# Patient Record
Sex: FEMALE | Race: BLACK OR AFRICAN AMERICAN | NOT HISPANIC OR LATINO | Employment: UNEMPLOYED | URBAN - METROPOLITAN AREA
[De-identification: names, ages, dates, MRNs, and addresses within clinical notes are randomized per-mention and may not be internally consistent; named-entity substitution may affect disease eponyms.]

---

## 2022-04-11 ENCOUNTER — HISTORICAL (OUTPATIENT)
Dept: ADMINISTRATIVE | Facility: HOSPITAL | Age: 67
End: 2022-04-11

## 2022-04-27 VITALS
OXYGEN SATURATION: 97 % | SYSTOLIC BLOOD PRESSURE: 210 MMHG | DIASTOLIC BLOOD PRESSURE: 100 MMHG | WEIGHT: 170 LBS | HEIGHT: 62 IN | BODY MASS INDEX: 31.28 KG/M2

## 2024-01-10 ENCOUNTER — HOSPITAL ENCOUNTER (INPATIENT)
Facility: HOSPITAL | Age: 69
LOS: 2 days | Discharge: HOME | DRG: 281 | End: 2024-01-12
Attending: STUDENT IN AN ORGANIZED HEALTH CARE EDUCATION/TRAINING PROGRAM | Admitting: INTERNAL MEDICINE
Payer: MEDICARE

## 2024-01-10 DIAGNOSIS — G45.9 TIA (TRANSIENT ISCHEMIC ATTACK): Primary | ICD-10-CM

## 2024-01-10 DIAGNOSIS — I16.1 HYPERTENSIVE EMERGENCY: ICD-10-CM

## 2024-01-10 DIAGNOSIS — N39.0 URINARY TRACT INFECTION WITHOUT HEMATURIA, SITE UNSPECIFIED: ICD-10-CM

## 2024-01-10 DIAGNOSIS — I10 UNCONTROLLED HYPERTENSION: ICD-10-CM

## 2024-01-10 DIAGNOSIS — R79.89 ELEVATED TROPONIN: ICD-10-CM

## 2024-01-10 DIAGNOSIS — R07.9 CHEST PAIN: ICD-10-CM

## 2024-01-10 DIAGNOSIS — I21.4 NSTEMI (NON-ST ELEVATED MYOCARDIAL INFARCTION): ICD-10-CM

## 2024-01-10 LAB
ALBUMIN SERPL-MCNC: 4.1 G/DL (ref 3.4–4.8)
ALBUMIN/GLOB SERPL: 1.4 RATIO (ref 1.1–2)
ALP SERPL-CCNC: 78 UNIT/L (ref 40–150)
ALT SERPL-CCNC: 19 UNIT/L (ref 0–55)
APPEARANCE UR: ABNORMAL
APTT PPP: 51.8 SECONDS (ref 23.2–33.7)
APTT PPP: 76 SECONDS (ref 23.2–33.7)
AST SERPL-CCNC: 22 UNIT/L (ref 5–34)
BACTERIA #/AREA URNS AUTO: ABNORMAL /HPF
BASOPHILS # BLD AUTO: 0.03 X10(3)/MCL
BASOPHILS NFR BLD AUTO: 0.4 %
BILIRUB SERPL-MCNC: 0.8 MG/DL
BILIRUB UR QL STRIP.AUTO: NEGATIVE
BUN SERPL-MCNC: 16.9 MG/DL (ref 9.8–20.1)
CALCIUM SERPL-MCNC: 9.2 MG/DL (ref 8.4–10.2)
CHLORIDE SERPL-SCNC: 111 MMOL/L (ref 98–107)
CHOLEST SERPL-MCNC: 265 MG/DL
CHOLEST/HDLC SERPL: 4 {RATIO} (ref 0–5)
CO2 SERPL-SCNC: 22 MMOL/L (ref 23–31)
COLOR UR AUTO: ABNORMAL
CREAT SERPL-MCNC: 1.11 MG/DL (ref 0.55–1.02)
EOSINOPHIL # BLD AUTO: 0.01 X10(3)/MCL (ref 0–0.9)
EOSINOPHIL NFR BLD AUTO: 0.1 %
ERYTHROCYTE [DISTWIDTH] IN BLOOD BY AUTOMATED COUNT: 13.9 % (ref 11.5–17)
GFR SERPLBLD CREATININE-BSD FMLA CKD-EPI: 54 MLS/MIN/1.73/M2
GLOBULIN SER-MCNC: 3 GM/DL (ref 2.4–3.5)
GLUCOSE SERPL-MCNC: 110 MG/DL (ref 82–115)
GLUCOSE UR QL STRIP.AUTO: ABNORMAL
HCT VFR BLD AUTO: 46.5 % (ref 37–47)
HDLC SERPL-MCNC: 70 MG/DL (ref 35–60)
HGB BLD-MCNC: 15.5 G/DL (ref 12–16)
HOLD SPECIMEN: NORMAL
HYALINE CASTS #/AREA URNS LPF: ABNORMAL /LPF
IMM GRANULOCYTES # BLD AUTO: 0.02 X10(3)/MCL (ref 0–0.04)
IMM GRANULOCYTES NFR BLD AUTO: 0.3 %
INR PPP: 0.9
INR PPP: 1
KETONES UR QL STRIP.AUTO: NEGATIVE
LACTATE SERPL-SCNC: 1.4 MMOL/L (ref 0.5–2.2)
LDLC SERPL CALC-MCNC: 175 MG/DL (ref 50–140)
LEUKOCYTE ESTERASE UR QL STRIP.AUTO: NEGATIVE
LYMPHOCYTES # BLD AUTO: 0.98 X10(3)/MCL (ref 0.6–4.6)
LYMPHOCYTES NFR BLD AUTO: 12.4 %
MAGNESIUM SERPL-MCNC: 2 MG/DL (ref 1.6–2.6)
MCH RBC QN AUTO: 32.2 PG (ref 27–31)
MCHC RBC AUTO-ENTMCNC: 33.3 G/DL (ref 33–36)
MCV RBC AUTO: 96.7 FL (ref 80–94)
MONOCYTES # BLD AUTO: 0.61 X10(3)/MCL (ref 0.1–1.3)
MONOCYTES NFR BLD AUTO: 7.7 %
MUCOUS THREADS URNS QL MICRO: ABNORMAL /LPF
NEUTROPHILS # BLD AUTO: 6.26 X10(3)/MCL (ref 2.1–9.2)
NEUTROPHILS NFR BLD AUTO: 79.1 %
NITRITE UR QL STRIP.AUTO: NEGATIVE
NRBC BLD AUTO-RTO: 0 %
PH UR STRIP.AUTO: 6 [PH]
PLATELET # BLD AUTO: 178 X10(3)/MCL (ref 130–400)
PMV BLD AUTO: 12.5 FL (ref 7.4–10.4)
POCT GLUCOSE: 93 MG/DL (ref 70–110)
POTASSIUM SERPL-SCNC: 3.7 MMOL/L (ref 3.5–5.1)
PROT SERPL-MCNC: 7.1 GM/DL (ref 5.8–7.6)
PROT UR QL STRIP.AUTO: ABNORMAL
PROTHROMBIN TIME: 12.5 SECONDS (ref 11.4–14)
PROTHROMBIN TIME: 13.4 SECONDS (ref 11.4–14)
RBC # BLD AUTO: 4.81 X10(6)/MCL (ref 4.2–5.4)
RBC #/AREA URNS AUTO: ABNORMAL /HPF
RBC UR QL AUTO: ABNORMAL
SODIUM SERPL-SCNC: 142 MMOL/L (ref 136–145)
SP GR UR STRIP.AUTO: 1.02 (ref 1–1.03)
SQUAMOUS #/AREA URNS LPF: ABNORMAL /HPF
TRIGL SERPL-MCNC: 98 MG/DL (ref 37–140)
TROPONIN I SERPL-MCNC: 0.08 NG/ML (ref 0–0.04)
TROPONIN I SERPL-MCNC: 0.1 NG/ML (ref 0–0.04)
TROPONIN I SERPL-MCNC: 0.13 NG/ML (ref 0–0.04)
UROBILINOGEN UR STRIP-ACNC: NORMAL
VLDLC SERPL CALC-MCNC: 20 MG/DL
WBC # SPEC AUTO: 7.91 X10(3)/MCL (ref 4.5–11.5)
WBC #/AREA URNS AUTO: ABNORMAL /HPF

## 2024-01-10 PROCEDURE — 96375 TX/PRO/DX INJ NEW DRUG ADDON: CPT

## 2024-01-10 PROCEDURE — 85730 THROMBOPLASTIN TIME PARTIAL: CPT

## 2024-01-10 PROCEDURE — 87040 BLOOD CULTURE FOR BACTERIA: CPT | Performed by: STUDENT IN AN ORGANIZED HEALTH CARE EDUCATION/TRAINING PROGRAM

## 2024-01-10 PROCEDURE — 83735 ASSAY OF MAGNESIUM: CPT | Performed by: STUDENT IN AN ORGANIZED HEALTH CARE EDUCATION/TRAINING PROGRAM

## 2024-01-10 PROCEDURE — 82962 GLUCOSE BLOOD TEST: CPT

## 2024-01-10 PROCEDURE — 93005 ELECTROCARDIOGRAM TRACING: CPT

## 2024-01-10 PROCEDURE — 11000001 HC ACUTE MED/SURG PRIVATE ROOM

## 2024-01-10 PROCEDURE — 85025 COMPLETE CBC W/AUTO DIFF WBC: CPT | Performed by: STUDENT IN AN ORGANIZED HEALTH CARE EDUCATION/TRAINING PROGRAM

## 2024-01-10 PROCEDURE — 84484 ASSAY OF TROPONIN QUANT: CPT

## 2024-01-10 PROCEDURE — 85730 THROMBOPLASTIN TIME PARTIAL: CPT | Performed by: INTERNAL MEDICINE

## 2024-01-10 PROCEDURE — 25500020 PHARM REV CODE 255

## 2024-01-10 PROCEDURE — 25000003 PHARM REV CODE 250

## 2024-01-10 PROCEDURE — 81001 URINALYSIS AUTO W/SCOPE: CPT | Performed by: STUDENT IN AN ORGANIZED HEALTH CARE EDUCATION/TRAINING PROGRAM

## 2024-01-10 PROCEDURE — 85610 PROTHROMBIN TIME: CPT | Performed by: STUDENT IN AN ORGANIZED HEALTH CARE EDUCATION/TRAINING PROGRAM

## 2024-01-10 PROCEDURE — 87086 URINE CULTURE/COLONY COUNT: CPT | Performed by: STUDENT IN AN ORGANIZED HEALTH CARE EDUCATION/TRAINING PROGRAM

## 2024-01-10 PROCEDURE — 80053 COMPREHEN METABOLIC PANEL: CPT | Performed by: STUDENT IN AN ORGANIZED HEALTH CARE EDUCATION/TRAINING PROGRAM

## 2024-01-10 PROCEDURE — 63600175 PHARM REV CODE 636 W HCPCS: Performed by: STUDENT IN AN ORGANIZED HEALTH CARE EDUCATION/TRAINING PROGRAM

## 2024-01-10 PROCEDURE — 99285 EMERGENCY DEPT VISIT HI MDM: CPT | Mod: 25

## 2024-01-10 PROCEDURE — 85610 PROTHROMBIN TIME: CPT

## 2024-01-10 PROCEDURE — 83605 ASSAY OF LACTIC ACID: CPT | Performed by: STUDENT IN AN ORGANIZED HEALTH CARE EDUCATION/TRAINING PROGRAM

## 2024-01-10 PROCEDURE — 63600175 PHARM REV CODE 636 W HCPCS

## 2024-01-10 PROCEDURE — 84484 ASSAY OF TROPONIN QUANT: CPT | Performed by: STUDENT IN AN ORGANIZED HEALTH CARE EDUCATION/TRAINING PROGRAM

## 2024-01-10 PROCEDURE — 80061 LIPID PANEL: CPT

## 2024-01-10 PROCEDURE — A9577 INJ MULTIHANCE: HCPCS

## 2024-01-10 PROCEDURE — 96374 THER/PROPH/DIAG INJ IV PUSH: CPT

## 2024-01-10 RX ORDER — CLONIDINE HYDROCHLORIDE 0.1 MG/1
0.1 TABLET ORAL ONCE
Status: COMPLETED | OUTPATIENT
Start: 2024-01-10 | End: 2024-01-10

## 2024-01-10 RX ORDER — LABETALOL HCL 20 MG/4 ML
5 SYRINGE (ML) INTRAVENOUS
Status: COMPLETED | OUTPATIENT
Start: 2024-01-10 | End: 2024-01-10

## 2024-01-10 RX ORDER — NIFEDIPINE 30 MG/1
30 TABLET, EXTENDED RELEASE ORAL DAILY
Status: DISCONTINUED | OUTPATIENT
Start: 2024-01-10 | End: 2024-01-10

## 2024-01-10 RX ORDER — GLUCAGON 1 MG
1 KIT INJECTION
Status: DISCONTINUED | OUTPATIENT
Start: 2024-01-10 | End: 2024-01-12 | Stop reason: HOSPADM

## 2024-01-10 RX ORDER — METHYLPREDNISOLONE SOD SUCC 125 MG
125 VIAL (EA) INJECTION
Status: COMPLETED | OUTPATIENT
Start: 2024-01-10 | End: 2024-01-10

## 2024-01-10 RX ORDER — AMLODIPINE BESYLATE 5 MG/1
5 TABLET ORAL DAILY
Status: DISCONTINUED | OUTPATIENT
Start: 2024-01-10 | End: 2024-01-10

## 2024-01-10 RX ORDER — CLONIDINE HYDROCHLORIDE 0.1 MG/1
0.1 TABLET ORAL ONCE
Status: DISCONTINUED | OUTPATIENT
Start: 2024-01-10 | End: 2024-01-10

## 2024-01-10 RX ORDER — SODIUM CHLORIDE 0.9 % (FLUSH) 0.9 %
10 SYRINGE (ML) INJECTION EVERY 12 HOURS PRN
Status: DISCONTINUED | OUTPATIENT
Start: 2024-01-10 | End: 2024-01-12 | Stop reason: HOSPADM

## 2024-01-10 RX ORDER — HYDRALAZINE HYDROCHLORIDE 20 MG/ML
10 INJECTION INTRAMUSCULAR; INTRAVENOUS EVERY 4 HOURS PRN
Status: DISCONTINUED | OUTPATIENT
Start: 2024-01-10 | End: 2024-01-12 | Stop reason: HOSPADM

## 2024-01-10 RX ORDER — HEPARIN SODIUM,PORCINE/D5W 25000/250
0-40 INTRAVENOUS SOLUTION INTRAVENOUS CONTINUOUS
Status: DISCONTINUED | OUTPATIENT
Start: 2024-01-10 | End: 2024-01-12

## 2024-01-10 RX ORDER — DIPHENHYDRAMINE HYDROCHLORIDE 50 MG/ML
50 INJECTION INTRAMUSCULAR; INTRAVENOUS
Status: COMPLETED | OUTPATIENT
Start: 2024-01-10 | End: 2024-01-10

## 2024-01-10 RX ORDER — IBUPROFEN 200 MG
24 TABLET ORAL
Status: DISCONTINUED | OUTPATIENT
Start: 2024-01-10 | End: 2024-01-12 | Stop reason: HOSPADM

## 2024-01-10 RX ORDER — LABETALOL HCL 20 MG/4 ML
10 SYRINGE (ML) INTRAVENOUS EVERY 4 HOURS PRN
Status: DISCONTINUED | OUTPATIENT
Start: 2024-01-10 | End: 2024-01-12 | Stop reason: HOSPADM

## 2024-01-10 RX ORDER — NIFEDIPINE 30 MG/1
30 TABLET, EXTENDED RELEASE ORAL NIGHTLY
Status: DISCONTINUED | OUTPATIENT
Start: 2024-01-10 | End: 2024-01-11

## 2024-01-10 RX ORDER — NALOXONE HCL 0.4 MG/ML
0.02 VIAL (ML) INJECTION
Status: DISCONTINUED | OUTPATIENT
Start: 2024-01-10 | End: 2024-01-12 | Stop reason: HOSPADM

## 2024-01-10 RX ORDER — IBUPROFEN 200 MG
16 TABLET ORAL
Status: DISCONTINUED | OUTPATIENT
Start: 2024-01-10 | End: 2024-01-12 | Stop reason: HOSPADM

## 2024-01-10 RX ORDER — ACETAMINOPHEN 325 MG/1
650 TABLET ORAL ONCE
Status: COMPLETED | OUTPATIENT
Start: 2024-01-10 | End: 2024-01-10

## 2024-01-10 RX ORDER — CIPROFLOXACIN 2 MG/ML
400 INJECTION, SOLUTION INTRAVENOUS ONCE
Status: DISCONTINUED | OUTPATIENT
Start: 2024-01-10 | End: 2024-01-10

## 2024-01-10 RX ADMIN — METHYLPREDNISOLONE SODIUM SUCCINATE 125 MG: 125 INJECTION, POWDER, FOR SOLUTION INTRAMUSCULAR; INTRAVENOUS at 11:01

## 2024-01-10 RX ADMIN — GADOBENATE DIMEGLUMINE 17 ML: 529 INJECTION, SOLUTION INTRAVENOUS at 03:01

## 2024-01-10 RX ADMIN — LABETALOL HYDROCHLORIDE 5 MG: 5 INJECTION, SOLUTION INTRAVENOUS at 11:01

## 2024-01-10 RX ADMIN — DIPHENHYDRAMINE HYDROCHLORIDE 50 MG: 50 INJECTION INTRAMUSCULAR; INTRAVENOUS at 11:01

## 2024-01-10 RX ADMIN — CLONIDINE HYDROCHLORIDE 0.1 MG: 0.1 TABLET ORAL at 04:01

## 2024-01-10 RX ADMIN — ACETAMINOPHEN 650 MG: 325 TABLET, FILM COATED ORAL at 10:01

## 2024-01-10 RX ADMIN — IOHEXOL 100 ML: 350 INJECTION, SOLUTION INTRAVENOUS at 12:01

## 2024-01-10 RX ADMIN — NIFEDIPINE 30 MG: 30 TABLET, FILM COATED, EXTENDED RELEASE ORAL at 09:01

## 2024-01-10 RX ADMIN — HEPARIN SODIUM 12 UNITS/KG/HR: 10000 INJECTION, SOLUTION INTRAVENOUS at 04:01

## 2024-01-10 NOTE — H&P
[facility] Medicine  History & Physical      Patient Name: Tiffanie Rowan  : 1955  MRN: [id number]  Patient Class: IP- Inpatient   Admission Date: 1/10/2024   Length of Stay: 0  Admitting Service: Hospital Medicine  Attending Physician: Priscilla Bearden MD  PCP: No, Primary Doctor  Source of history: Patient, patient's family, and EMR.   Code status: Full Code    Chief Complaint   Fall (Pt reports waking this am an unable to move right side, pt states she fell, hit head, no LOC. Hematoma to right side of head. Pt states slurred speech. Paramedics showed up, pt returned to normal movement, no slurred speech. PT IS ASYMPTOMATIC at this time. Dr. Carrasco in triage assessing patient. CBG 93. No stroke alert called. )    History of Present Illness   68 y.o. female with HTN and gout presents to ED for right sided upper and lower extremity onset this morning, lasting several minutes. Reports resolution of symptoms at this time. Associated symptoms include headache and blurred vision. Denies chest pain, falls, dysuria, recent illness, fever, n/v. Reports has hx of htn but stopped taking BP meds approx 3 years ago.     ROS   Pertinent positive and negative as mentioned in HPI   Review of Systems   Constitutional:  Negative for chills and fever.   HENT:  Negative for congestion and sore throat.    Eyes:  Positive for blurred vision. Negative for discharge.   Respiratory:  Negative for cough and shortness of breath.    Cardiovascular:  Negative for chest pain.   Gastrointestinal:  Negative for constipation, diarrhea and vomiting.   Genitourinary:  Negative for dysuria and hematuria.   Skin:  Negative for rash.   Neurological:  Positive for weakness and headaches. Negative for dizziness and seizures.   Endo/Heme/Allergies:  Does not bruise/bleed easily.   Psychiatric/Behavioral:  Negative for hallucinations.      Past Medical History   No past medical history on file.  Past Surgical History   No  past surgical history on file.  Social History     Social History     Tobacco Use    Smoking status: Not on file    Smokeless tobacco: Not on file   Substance Use Topics    Alcohol use: Not on file      Family History   Reviewed and noncontributory    Allergies   Ciprofloxacin and Keflex [cephalexin]  Home Medications     Prior to Admission medications    Not on File      Inpatient Medications   Scheduled Meds    Continuous Infusions   heparin (porcine) in D5W 12 Units/kg/hr (01/10/24 1617)     PRN Meds  dextrose 10%, dextrose 10%, glucagon (human recombinant), glucose, glucose, heparin (PORCINE), heparin (PORCINE), hydrALAZINE **AND** labetalol, naloxone, sodium chloride 0.9%    Physical Exam   Vital Signs  Temp:  [98.2 °F (36.8 °C)]   Pulse:  [71-89]   Resp:  [15-23]   BP: (173-237)/()   SpO2:  [96 %-100 %]       General: Appears comfortable  HEENT: NC/AT  Neck:  No JVD  CVS: Regular rhythm. Normal S1/S2.  Abdomen: nondistended, normoactive BS, soft and non-tender.  MSK: No obvious deformity or joint swelling  Skin: Warm and dry  Neuro: AAOx3, no focal neurological deficit. CN II-XII grossly intact. Normal speech without dysarthria.   Psych: Cooperative    Labs   I have reviewed the following results below:  CBC  Recent Labs     01/10/24  0939   WBC 7.91   RBC 4.81   HGB 15.5   HCT 46.5   MCV 96.7*   MCH 32.2*   MCHC 33.3   RDW 13.9        Coags  Recent Labs     01/10/24  1625   INR 1.0     Cardiac  Recent Labs     01/10/24  0939 01/10/24  1408 01/10/24  1625   TROPONINI 0.078* 0.126* 0.104*     ABG/Lactate  Recent Labs     01/10/24  1124   LACTIC 1.4      Urinalysis  Recent Labs     01/10/24  1002   COLORUA Light-Yellow   APPEARANCEUA Turbid*   SGUA 1.018   PHUA 6.0   PROTEINUA 1+*   GLUCOSEUA Trace*   KETONESUA Negative   BLOODUA 1+*   UROBILINOGEN Normal   NITRITESUA Negative   LEUKOCYTESUR Negative      BMP  Recent Labs     01/10/24  0939      K 3.7   CHLORIDE 111*   CO2 22*   BUN 16.9    CREATININE 1.11*   GLUCOSE 110   CALCIUM 9.2   MG 2.00     LFTs  Recent Labs     01/10/24  0939   ALBUMIN 4.1   GLOBULIN 3.0   ALKPHOS 78   ALT 19   AST 22   BILITOT 0.8     Lipid  Recent Labs     01/10/24  0939   CHOL 265*   TRIG 98   .00*   VLDL 20   HDL 70*     Diabetes  Recent Labs     01/10/24  0939   GLUCOSE 110        Microbiology Results (last 7 days)       Procedure Component Value Units Date/Time    Blood culture #1 **CANNOT BE ORDERED STAT** [[id number]] Collected: 01/10/24 1124    Order Status: Resulted Specimen: Blood from Antecubital, Left Updated: 01/10/24 1128    Blood culture #2 **CANNOT BE ORDERED STAT** [[id number]] Collected: 01/10/24 1124    Order Status: Resulted Specimen: Blood from Hand, Left Updated: 01/10/24 1128    Urine culture [[id number]] Collected: 01/10/24 1002    Order Status: Sent Specimen: Urine Updated: 01/10/24 1013           Imaging     MRI Brain W WO Contrast   Final Result      1. No acute intracranial abnormality.   2. Moderate chronic microvascular ischemic changes.         Electronically signed by: Micki Ambriz   Date:    01/10/2024   Time:    15:52      CTA Head and Neck (xpd)   Final Result      No large vessel occlusion or flow-limiting stenosis.         Electronically signed by: Micki Ambriz   Date:    01/10/2024   Time:    12:34      CT Head Without Contrast   Final Result      No acute intracranial process identified.         Electronically signed by: Kyler Boo   Date:    01/10/2024   Time:    10:52      X-Ray Chest AP Portable   Final Result      No acute chest disease is identified.         Electronically signed by: Dawson Frazier   Date:    01/10/2024   Time:    10:30        Assessment & Plan     HTN Emergency   TIA  ACS - NSTEMI  /120 upon arrival to ED, with BP max 237/143  She received labetalol which improved BP to 180s/100s, then dose of clonidine   Initial Troponin 0.072, then 0.126, improving to 0.104. continue to trend  EKG  "without ROSSI, inverted T waves in V5, V6, no prior EKGs  Denies ever having chest pain and currently without chest pain   Likely type II NSTEMI, will start heparin drip  CTA head/neck without evidence of hemorrhage, vascular occulusion, or stenosis   MRI brain significant for chronic microvascular changes  CXR without acute cardiopulmonary findings    Obtain echo  Consider starting nifedipine in AM as patient requests not to give amlodipine or lisinopril as these meds "make my chest feel weird".  Continue with PRN labetalol, hydralazine   Low threshold to start cleviprex drip and upgrade to ICU due to elevated troponins, however will keep on floor for now given appropriate response to labetalol, asymptomatic, and downtrending troponin.    Access: pIV  Diet: heart healthy  DVT Prophylaxis: heparin drip    Ethel Turner MD  Family Medicine, [facility]     "

## 2024-01-10 NOTE — ED PROVIDER NOTES
Encounter Date: 1/10/2024       History     Chief Complaint   Patient presents with    Fall     Pt reports waking this am an unable to move right side, pt states she fell, hit head, no LOC. Hematoma to right side of head. Pt states slurred speech. Paramedics showed up, pt returned to normal movement, no slurred speech. PT IS ASYMPTOMATIC at this time. Dr. Carrasco in triage assessing patient. CBG 93. No stroke alert called.      Patient presents to the emergency department for weakness that is now resolved.  She states she went to bed last night around 1:00 a.m. he was feeling fine.  She states she woke up this morning, noticed she could move her right arm or right leg and they felt numb.  She tried to get up out of bed and fell onto her right side hitting her head.  She denies any loss of consciousness.  She was states shortly after paramedics arrived her symptoms had completely resolved and she was no complaints now.  She also reports her speech was slurred during this episode.  No current changes in vision, no current weakness, no current difficulty speaking.  Does have a history of hypertension in his a smoker.  No previous history of a CVA.    The history is provided by the patient.     Review of patient's allergies indicates:   Allergen Reactions    Ciprofloxacin Itching    Iodine Itching, Swelling and Hives    Cephalexin Hives     No past medical history on file.  No past surgical history on file.  No family history on file.  Social History     Tobacco Use    Smoking status: Former     Current packs/day: 0.00     Types: Cigarettes     Quit date: 1/10/2024     Years since quittin.0     Passive exposure: Past    Smokeless tobacco: Never     Review of Systems   Constitutional:  Negative for chills and fever.   HENT:  Negative for congestion and sore throat.    Respiratory:  Negative for cough and shortness of breath.    Cardiovascular:  Negative for chest pain and palpitations.   Gastrointestinal:  Negative for  abdominal pain and nausea.   Genitourinary:  Negative for dysuria and hematuria.   Musculoskeletal:  Negative for arthralgias and myalgias.   Neurological:  Positive for speech difficulty and weakness. Negative for dizziness.       Physical Exam     Initial Vitals [01/10/24 0857]   BP Pulse Resp Temp SpO2   (!) 190/120 89 18 98.2 °F (36.8 °C) 100 %      MAP       --         Physical Exam    Nursing note and vitals reviewed.  Constitutional: She appears well-developed and well-nourished.   HENT:   Head: Normocephalic and atraumatic.   Eyes: EOM are normal. Pupils are equal, round, and reactive to light.   Neck: Neck supple.   Normal range of motion.  Cardiovascular:  Normal rate, regular rhythm and normal heart sounds.           Pulmonary/Chest: Breath sounds normal. No respiratory distress.   Abdominal: Abdomen is soft. There is no abdominal tenderness.   Musculoskeletal:         General: No edema. Normal range of motion.      Cervical back: Normal range of motion and neck supple.     Neurological: She is alert and oriented to person, place, and time. She has normal strength. No cranial nerve deficit or sensory deficit.   Skin: Skin is warm and dry.         ED Course   Procedures  Labs Reviewed   COMPREHENSIVE METABOLIC PANEL - Abnormal; Notable for the following components:       Result Value    Chloride 111 (*)     Carbon Dioxide 22 (*)     Creatinine 1.11 (*)     All other components within normal limits   TROPONIN I - Abnormal; Notable for the following components:    Troponin-I 0.078 (*)     All other components within normal limits   URINALYSIS, REFLEX TO URINE CULTURE - Abnormal; Notable for the following components:    Appearance, UA Turbid (*)     Protein, UA 1+ (*)     Glucose, UA Trace (*)     Blood, UA 1+ (*)     WBC, UA 11-20 (*)     Bacteria, UA Moderate (*)     Squamous Epithelial Cells, UA Few (*)     Mucous, UA Trace (*)     Hyaline Casts, UA 0-2 (*)     All other components within normal limits    CBC WITH DIFFERENTIAL - Abnormal; Notable for the following components:    MCV 96.7 (*)     MCH 32.2 (*)     MPV 12.5 (*)     All other components within normal limits   TROPONIN I - Abnormal; Notable for the following components:    Troponin-I 0.126 (*)     All other components within normal limits   LIPID PANEL - Abnormal; Notable for the following components:    Cholesterol Total 265 (*)     HDL Cholesterol 70 (*)     LDL Cholesterol 175.00 (*)     All other components within normal limits   APTT - Abnormal; Notable for the following components:    PTT 51.8 (*)     All other components within normal limits   TROPONIN I - Abnormal; Notable for the following components:    Troponin-I 0.104 (*)     All other components within normal limits   APTT - Abnormal; Notable for the following components:    PTT 76.0 (*)     All other components within normal limits   APTT - Abnormal; Notable for the following components:    PTT 66.1 (*)     All other components within normal limits   COMPREHENSIVE METABOLIC PANEL - Abnormal; Notable for the following components:    Chloride 108 (*)     Carbon Dioxide 20 (*)     Glucose Level 128 (*)     Creatinine 1.20 (*)     All other components within normal limits   CBC WITH DIFFERENTIAL - Abnormal; Notable for the following components:    MCV 96.8 (*)     MCH 32.5 (*)     MPV 13.5 (*)     IPF 11.5 (*)     All other components within normal limits   MAGNESIUM - Normal   PROTIME-INR - Normal   LACTIC ACID, PLASMA - Normal   PROTIME-INR - Normal   PHOSPHORUS - Normal   MAGNESIUM - Normal   TSH - Normal   T4, FREE - Normal   CBC W/ AUTO DIFFERENTIAL    Narrative:     The following orders were created for panel order CBC auto differential.  Procedure                               Abnormality         Status                     ---------                               -----------         ------                     CBC with Differential[[id number]]       Abnormal            Final result                  Please view results for these tests on the individual orders.   EXTRA TUBES    Narrative:     The following orders were created for panel order EXTRA TUBES.  Procedure                               Abnormality         Status                     ---------                               -----------         ------                     Gold Top Hold[[id number]]                                   Final result                 Please view results for these tests on the individual orders.   GOLD TOP HOLD   EXTRA TUBES    Narrative:     The following orders were created for panel order EXTRA TUBES.  Procedure                               Abnormality         Status                     ---------                               -----------         ------                     Light Green Top Hold[[id number]]                            Final result               Lavender Top Hold[[id number]]                               Final result                 Please view results for these tests on the individual orders.   LIGHT GREEN TOP HOLD   LAVENDER TOP HOLD   CBC W/ AUTO DIFFERENTIAL    Narrative:     The following orders were created for panel order CBC Auto Differential.  Procedure                               Abnormality         Status                     ---------                               -----------         ------                     CBC with Differential[[id number]]       Abnormal            Final result                 Please view results for these tests on the individual orders.   HEMOGLOBIN A1C   POCT GLUCOSE     EKG Readings: (Independently Interpreted)   Initial Reading: No STEMI. Rhythm: Normal Sinus Rhythm. Heart Rate: 83. Ectopy: No Ectopy. Conduction: Normal. Axis: Normal.     ECG Results              EKG 12-lead (Final result)  Result time 01/10/24 20:01:22      Final result by Interface, Lab In [facility] (01/10/24 20:01:22)                   Narrative:    Test Reason : I16.1,    Vent. Rate : 081 BPM     Atrial  Rate : 081 BPM     P-R Int : 154 ms          QRS Dur : 080 ms      QT Int : 414 ms       P-R-T Axes : 074 019 -02 degrees     QTc Int : 480 ms    Normal sinus rhythm  T wave abnormality, consider lateral ischemia  Prolonged QT  Abnormal ECG  When compared with ECG of 10-DERECK-2024 09:16,  Nonspecific T wave abnormality now evident in Inferior leads  Confirmed by Liu Blair MD ([id number]) on 1/10/2024 8:01:15 PM    Referred By: RODRI   SELF           Confirmed By:Liu Blair MD                                     EKG 12-lead (Final result)  Result time 01/10/24 19:56:13      Final result by Interface, Lab In [facility] (01/10/24 19:56:13)                   Narrative:    Test Reason : G45.9,    Vent. Rate : 083 BPM     Atrial Rate : 083 BPM     P-R Int : 122 ms          QRS Dur : 084 ms      QT Int : 388 ms       P-R-T Axes : 071 009 109 degrees     QTc Int : 455 ms    Normal sinus rhythm  Nonspecific T wave abnormality  Abnormal ECG  No previous ECGs available  Confirmed by Liu Blair MD ([id number]) on 1/10/2024 7:56:02 PM    Referred By: AAAREFROGE   SELF           Confirmed By:Liu Blair MD                                     EKG 12-LEAD (Final result)  Result time 01/12/24 10:01:17      Final result by Unknown User (01/12/24 10:01:17)                                      Imaging Results              MRI Brain W WO Contrast (Final result)  Result time 01/10/24 15:52:10      Final result by Micki Ambriz MD (01/10/24 15:52:10)                   Impression:      1. No acute intracranial abnormality.  2. Moderate chronic microvascular ischemic changes.      Electronically signed by: Micki Ambriz  Date:    01/10/2024  Time:    15:52               Narrative:    EXAMINATION:  MRI BRAIN W WO CONTRAST    CLINICAL HISTORY:  Transient ischemic attack (TIA);    TECHNIQUE:  Multiplanar, multisequence MR images of the brain were obtained with and without administration of intravenous contrast.    COMPARISON:  CT  head dated 01/10/2024    FINDINGS:  There is no restricted diffusion, hemorrhage or edema.  Moderate patchy T2/FLAIR hyperintensities in the subcortical and periventricular white matter likely represent chronic microvascular ischemic changes.  There is no abnormal parenchymal or leptomeningeal enhancement.    There is no mass effect or midline shift.  The basal cisterns are patent.  There is mild diffuse parenchymal volume loss.  No hydrocephalus or abnormal extra-axial fluid collection.  The major intracranial flow voids are patent.  There is trace scattered paranasal sinus mucosal thickening.                                       CTA Head and Neck (xpd) (Final result)  Result time 01/10/24 12:34:59      Final result by Micki Ambrzi MD (01/10/24 12:34:59)                   Impression:      No large vessel occlusion or flow-limiting stenosis.      Electronically signed by: Micki Ambriz  Date:    01/10/2024  Time:    12:34               Narrative:    EXAMINATION:  CTA HEAD AND NECK (XPD)    CLINICAL HISTORY:  Stroke/TIA, determine embolic source;    TECHNIQUE:  Axial images obtained through the cervical region and [place] before and after the administration of intravenous contrast.    Coronal, sagittal, MIP and 3D reconstructions were obtained from the axial data.    Automatic exposure control was utilized to limit radiation dose.    Radiation Dose:    Total DLP: 1863 mGy*cm    COMPARISON:  CT head dated 01/10/2023    FINDINGS:  Head CT with contrast:    No interval changes when compared to the previous CT.    No enhancing abnormalities.    If present, stenosis of the carotid bulbs is measured based on NASCET criteria,    i.e. area of maximal stenosis compared to the cervical ICA distal to the bulb.    Cervical CTA:    The origins of the great vessels are patent with mild scattered calcifications.    The common carotid arteries, carotid bulbs and internal carotid arteries are patent.  There are  mild calcifications at the carotid bulbs without hemodynamically significant stenosis.    The vertebral arteries are patent.    Intracranial CTA:    There are calcifications along the course of the carotid siphons with mild narrowing bilaterally.  The middle cerebral arteries and anterior cerebral arteries are patent, with mild narrowing of a distal right MCA branch..    The vertebral arteries, basilar artery and posterior cerebral arteries are patent, with mild narrowing of the left posterior cerebral artery.    The dural venous sinuses are patent.                                       CT Head Without Contrast (Final result)  Result time 01/10/24 10:52:06      Final result by Kyler Boo MD (01/10/24 10:52:06)                   Impression:      No acute intracranial process identified.      Electronically signed by: Kyler Boo  Date:    01/10/2024  Time:    10:52               Narrative:    EXAMINATION:  CT HEAD WITHOUT CONTRAST    CLINICAL HISTORY:  Neuro deficit, acute, stroke suspected;    TECHNIQUE:  CT images of the head without IV contrast. Axial, coronal and sagittal images reviewed. Dose length product 2052 mGycm. Automatic exposure control, adjustment of mA/kV or iterative reconstruction technique used to limit radiation dose.    COMPARISON:  No relevant comparison studies available at the time of dictation.    FINDINGS:  Extra-axial spaces/ventricular system: Normal for age.    Intracranial hemorrhage: None identified.    Cerebral parenchyma: No acute large vessel territory infarct or mass effect identified. Confluent hypodensities in the supratentorial white matter are likely moderate chronic small vessel ischemic change.    Vascular system: No hyperdense vessel appreciated.    Cerebellum: Normal.    Sella: Normal.    Included paranasal sinuses and mastoid air cells: Well-aerated.    Visualized orbits: Normal.    Scalp/Calvarium: No depressed skull fracture.                                        X-Ray Chest AP Portable (Final result)  Result time 01/10/24 10:30:04      Final result by Dawson Frazier MD (01/10/24 10:30:04)                   Impression:      No acute chest disease is identified.      Electronically signed by: Dawson Frazier  Date:    01/10/2024  Time:    10:30               Narrative:    EXAMINATION:  XR CHEST AP PORTABLE    CLINICAL HISTORY:  TIA;, .    COMPARISON:  March 19, 2021    FINDINGS:  No alveolar consolidation, effusion, or pneumothorax is seen.   The thoracic aorta is normal  cardiac silhouette, central pulmonary vessels and mediastinum are normal in size and are grossly unremarkable.   visualized osseous structures are grossly unremarkable.                                       Medications   labetalol 20 mg/4 mL (5 mg/mL) IV syring (5 mg Intravenous Given 1/10/24 1107)   methylPREDNISolone sodium succinate injection 125 mg (125 mg Intravenous Given 1/10/24 1107)   diphenhydrAMINE injection 50 mg (50 mg Intravenous Given 1/10/24 1107)   iohexoL (OMNIPAQUE 350) 350 mg iodine/mL injection (100 mLs  Given 1/10/24 1200)   gadobenate dimeglumine (MULTIHANCE) 529 mg/mL (0.1mmol/0.2mL) injection (17 mLs Intravenous Given 1/10/24 1500)   heparin 25,000 units in dextrose 5% (100 units/ml) IV bolus from bag INITIAL BOLUS (max bolus 4000 units) (4,000 Units Intravenous Bolus from Bag 1/10/24 1617)   cloNIDine tablet 0.1 mg (0.1 mg Oral Given 1/10/24 1620)   acetaminophen tablet 650 mg (650 mg Oral Given 1/10/24 2232)   regadenoson injection 0.4 mg (0.4 mg Intravenous Given 1/12/24 1110)     Medical Decision Making  Patient was asymptomatic at this point, concerning for TIA.  CT of the brain was without acute process.  EKGs shows no acute concerning changes CBC and CMP were unremarkable but she has a mildly elevated troponin, possibly from uncontrolled hypertension versus CVA.  CTA are negative for vascular occlusion.  She was given labetalol due to severe hypertension.  Admitted to  Medicine for further evaluation.    Amount and/or Complexity of Data Reviewed  Labs: ordered. Decision-making details documented in ED Course.  Radiology: ordered. Decision-making details documented in ED Course.  ECG/medicine tests: ordered and independent interpretation performed. Decision-making details documented in ED Course.    Risk  Prescription drug management.  Decision regarding hospitalization.                                      Clinical Impression:  Final diagnoses:  [G45.9] TIA (transient ischemic attack) (Primary)  [I10] Uncontrolled hypertension  [N39.0] Urinary tract infection without hematuria, site unspecified  [R79.89] Elevated troponin          ED Disposition Condition    Admit Stable                Dony Carrasco MD  01/22/24 7856

## 2024-01-11 PROBLEM — R79.89 ELEVATED TROPONIN: Status: ACTIVE | Noted: 2024-01-11

## 2024-01-11 PROBLEM — G45.9 TIA (TRANSIENT ISCHEMIC ATTACK): Status: ACTIVE | Noted: 2024-01-11

## 2024-01-11 PROBLEM — I10 UNCONTROLLED HYPERTENSION: Status: ACTIVE | Noted: 2024-01-11

## 2024-01-11 PROBLEM — I16.1 HYPERTENSIVE EMERGENCY: Status: ACTIVE | Noted: 2024-01-11

## 2024-01-11 PROBLEM — I21.4 NSTEMI (NON-ST ELEVATED MYOCARDIAL INFARCTION): Status: ACTIVE | Noted: 2024-01-11

## 2024-01-11 LAB
ALBUMIN SERPL-MCNC: 3.8 G/DL (ref 3.4–4.8)
ALBUMIN/GLOB SERPL: 1.3 RATIO (ref 1.1–2)
ALP SERPL-CCNC: 68 UNIT/L (ref 40–150)
ALT SERPL-CCNC: 17 UNIT/L (ref 0–55)
APTT PPP: 66.1 SECONDS (ref 23.2–33.7)
AST SERPL-CCNC: 21 UNIT/L (ref 5–34)
AV INDEX (PROSTH): 0.61
AV MEAN GRADIENT: 10 MMHG
AV PEAK GRADIENT: 19 MMHG
AV VALVE AREA BY VELOCITY RATIO: 1.76 CM²
AV VALVE AREA: 2.17 CM²
AV VELOCITY RATIO: 0.5
BASOPHILS # BLD AUTO: 0.01 X10(3)/MCL
BASOPHILS NFR BLD AUTO: 0.1 %
BILIRUB SERPL-MCNC: 0.6 MG/DL
BSA FOR ECHO PROCEDURE: 1.88 M2
BUN SERPL-MCNC: 19.5 MG/DL (ref 9.8–20.1)
CALCIUM SERPL-MCNC: 9.1 MG/DL (ref 8.4–10.2)
CHLORIDE SERPL-SCNC: 108 MMOL/L (ref 98–107)
CO2 SERPL-SCNC: 20 MMOL/L (ref 23–31)
CREAT SERPL-MCNC: 1.2 MG/DL (ref 0.55–1.02)
CV ECHO LV RWT: 0.43 CM
DOP CALC AO PEAK VEL: 2.18 M/S
DOP CALC AO VTI: 38.8 CM
DOP CALC LVOT AREA: 3.6 CM2
DOP CALC LVOT DIAMETER: 2.13 CM
DOP CALC LVOT PEAK VEL: 1.08 M/S
DOP CALC LVOT STROKE VOLUME: 84.05 CM3
DOP CALC MV VTI: 25.7 CM
DOP CALCLVOT PEAK VEL VTI: 23.6 CM
E WAVE DECELERATION TIME: 158.57 MSEC
E/A RATIO: 1.1
E/E' RATIO: 16.36 M/S
ECHO LV POSTERIOR WALL: 0.93 CM (ref 0.6–1.1)
EOSINOPHIL # BLD AUTO: 0 X10(3)/MCL (ref 0–0.9)
EOSINOPHIL NFR BLD AUTO: 0 %
ERYTHROCYTE [DISTWIDTH] IN BLOOD BY AUTOMATED COUNT: 13.9 % (ref 11.5–17)
EST. AVERAGE GLUCOSE BLD GHB EST-MCNC: 108.3 MG/DL
FRACTIONAL SHORTENING: 41 % (ref 28–44)
GFR SERPLBLD CREATININE-BSD FMLA CKD-EPI: 49 MLS/MIN/1.73/M2
GLOBULIN SER-MCNC: 2.9 GM/DL (ref 2.4–3.5)
GLUCOSE SERPL-MCNC: 128 MG/DL (ref 82–115)
HBA1C MFR BLD: 5.4 %
HCT VFR BLD AUTO: 41.7 % (ref 37–47)
HGB BLD-MCNC: 14 G/DL (ref 12–16)
HOLD SPECIMEN: NORMAL
HOLD SPECIMEN: NORMAL
IMM GRANULOCYTES # BLD AUTO: 0.01 X10(3)/MCL (ref 0–0.04)
IMM GRANULOCYTES NFR BLD AUTO: 0.1 %
INTERVENTRICULAR SEPTUM: 1.45 CM (ref 0.6–1.1)
LEFT ATRIUM SIZE: 3.93 CM
LEFT INTERNAL DIMENSION IN SYSTOLE: 2.56 CM (ref 2.1–4)
LEFT VENTRICLE DIASTOLIC VOLUME INDEX: 45.28 ML/M2
LEFT VENTRICLE DIASTOLIC VOLUME: 83.76 ML
LEFT VENTRICLE MASS INDEX: 99 G/M2
LEFT VENTRICLE SYSTOLIC VOLUME INDEX: 12.8 ML/M2
LEFT VENTRICLE SYSTOLIC VOLUME: 23.64 ML
LEFT VENTRICULAR INTERNAL DIMENSION IN DIASTOLE: 4.32 CM (ref 3.5–6)
LEFT VENTRICULAR MASS: 183.76 G
LV LATERAL E/E' RATIO: 15 M/S
LV SEPTAL E/E' RATIO: 18 M/S
LVOT MG: 2.76 MMHG
LVOT MV: 0.8 CM/S
LYMPHOCYTES # BLD AUTO: 0.99 X10(3)/MCL (ref 0.6–4.6)
LYMPHOCYTES NFR BLD AUTO: 14.6 %
MAGNESIUM SERPL-MCNC: 2.1 MG/DL (ref 1.6–2.6)
MCH RBC QN AUTO: 32.5 PG (ref 27–31)
MCHC RBC AUTO-ENTMCNC: 33.6 G/DL (ref 33–36)
MCV RBC AUTO: 96.8 FL (ref 80–94)
MONOCYTES # BLD AUTO: 0.67 X10(3)/MCL (ref 0.1–1.3)
MONOCYTES NFR BLD AUTO: 9.9 %
MV MEAN GRADIENT: 2 MMHG
MV PEAK A VEL: 0.82 M/S
MV PEAK E VEL: 0.9 M/S
MV PEAK GRADIENT: 4 MMHG
MV STENOSIS PRESSURE HALF TIME: 45.98 MS
MV VALVE AREA BY CONTINUITY EQUATION: 3.27 CM2
MV VALVE AREA P 1/2 METHOD: 4.78 CM2
NEUTROPHILS # BLD AUTO: 5.08 X10(3)/MCL (ref 2.1–9.2)
NEUTROPHILS NFR BLD AUTO: 75.3 %
NRBC BLD AUTO-RTO: 0 %
OHS LV EJECTION FRACTION SIMPSONS BIPLANE MOD: 70 %
PHOSPHATE SERPL-MCNC: 3.6 MG/DL (ref 2.3–4.7)
PISA TR MAX VEL: 2.59 M/S
PLATELET # BLD AUTO: 159 X10(3)/MCL (ref 130–400)
PLATELETS.RETICULATED NFR BLD AUTO: 11.5 % (ref 0.9–11.2)
PMV BLD AUTO: 13.5 FL (ref 7.4–10.4)
POTASSIUM SERPL-SCNC: 3.9 MMOL/L (ref 3.5–5.1)
PROT SERPL-MCNC: 6.7 GM/DL (ref 5.8–7.6)
RA PRESSURE ESTIMATED: 3 MMHG
RBC # BLD AUTO: 4.31 X10(6)/MCL (ref 4.2–5.4)
RIGHT VENTRICULAR END-DIASTOLIC DIMENSION: 2.51 CM
RV TB RVSP: 6 MMHG
SODIUM SERPL-SCNC: 140 MMOL/L (ref 136–145)
T4 FREE SERPL-MCNC: 0.95 NG/DL (ref 0.7–1.48)
TDI LATERAL: 0.06 M/S
TDI SEPTAL: 0.05 M/S
TDI: 0.06 M/S
TR MAX PG: 27 MMHG
TSH SERPL-ACNC: 0.42 UIU/ML (ref 0.35–4.94)
TV REST PULMONARY ARTERY PRESSURE: 30 MMHG
WBC # SPEC AUTO: 6.76 X10(3)/MCL (ref 4.5–11.5)
Z-SCORE OF LEFT VENTRICULAR DIMENSION IN END DIASTOLE: -1.74
Z-SCORE OF LEFT VENTRICULAR DIMENSION IN END SYSTOLE: -1.69

## 2024-01-11 PROCEDURE — 63600175 PHARM REV CODE 636 W HCPCS: Mod: JZ,JG

## 2024-01-11 PROCEDURE — 85730 THROMBOPLASTIN TIME PARTIAL: CPT

## 2024-01-11 PROCEDURE — 85025 COMPLETE CBC W/AUTO DIFF WBC: CPT

## 2024-01-11 PROCEDURE — 84443 ASSAY THYROID STIM HORMONE: CPT

## 2024-01-11 PROCEDURE — 63600175 PHARM REV CODE 636 W HCPCS

## 2024-01-11 PROCEDURE — 25000003 PHARM REV CODE 250

## 2024-01-11 PROCEDURE — 84439 ASSAY OF FREE THYROXINE: CPT

## 2024-01-11 PROCEDURE — 83735 ASSAY OF MAGNESIUM: CPT

## 2024-01-11 PROCEDURE — 84100 ASSAY OF PHOSPHORUS: CPT

## 2024-01-11 PROCEDURE — 94761 N-INVAS EAR/PLS OXIMETRY MLT: CPT

## 2024-01-11 PROCEDURE — 83036 HEMOGLOBIN GLYCOSYLATED A1C: CPT

## 2024-01-11 PROCEDURE — 80053 COMPREHEN METABOLIC PANEL: CPT

## 2024-01-11 PROCEDURE — 11000001 HC ACUTE MED/SURG PRIVATE ROOM

## 2024-01-11 RX ORDER — ACETAMINOPHEN 325 MG/1
650 TABLET ORAL EVERY 6 HOURS PRN
Status: DISCONTINUED | OUTPATIENT
Start: 2024-01-11 | End: 2024-01-12 | Stop reason: HOSPADM

## 2024-01-11 RX ORDER — NIFEDIPINE 30 MG/1
60 TABLET, EXTENDED RELEASE ORAL NIGHTLY
Status: DISCONTINUED | OUTPATIENT
Start: 2024-01-11 | End: 2024-01-12 | Stop reason: HOSPADM

## 2024-01-11 RX ADMIN — LABETALOL HYDROCHLORIDE 10 MG: 5 INJECTION, SOLUTION INTRAVENOUS at 12:01

## 2024-01-11 RX ADMIN — LABETALOL HYDROCHLORIDE 10 MG: 5 INJECTION, SOLUTION INTRAVENOUS at 10:01

## 2024-01-11 RX ADMIN — HEPARIN SODIUM 12 UNITS/KG/HR: 10000 INJECTION, SOLUTION INTRAVENOUS at 06:01

## 2024-01-11 RX ADMIN — ACETAMINOPHEN 650 MG: 325 TABLET, FILM COATED ORAL at 08:01

## 2024-01-11 RX ADMIN — NIFEDIPINE 60 MG: 30 TABLET, FILM COATED, EXTENDED RELEASE ORAL at 08:01

## 2024-01-11 NOTE — PROGRESS NOTES
[facility] Medicine  Progress Note      Patient Name: Tiffanie Rowan  : 1955  MRN: [id number]  Patient Class: IP- Inpatient   Admission Date: 1/10/2024   Length of Stay: 1  Admitting Service: Hospital Medicine  Attending Physician: Priscilla Bearden MD  PCP: Melanie, Primary Doctor    CHIEF COMPLAINT   Hospital follow up    HOSPITAL COURSE     Brief HPI:  68 y.o. female with HTN and gout presents to ED for right sided upper and lower extremity onset this morning, lasting several minutes. Reports resolution of symptoms at this time. Associated symptoms include headache and blurred vision. Denies chest pain, falls, dysuria, recent illness, fever, n/v. Reports has hx of htn but stopped taking BP meds approx 3 years ago.      Interval History:  NAEON. BP continues to improve. Denies chest pain, SOB, weakness, neurological deficits overnight    OBJECTIVE/PHYSICAL EXAM     VITAL SIGNS (MOST RECENT):  Temp: 98.8 °F (37.1 °C) (24 0000)  Pulse: 71 (24 0302)  Resp: 16 (24 0302)  BP: (!) 153/83 (24 0302)  SpO2: 99 % (24 0302) VITAL SIGNS (24 HOUR RANGE):  Temp:  [98.8 °F (37.1 °C)]   Pulse:  []   Resp:  [11-18]   BP: (153-187)/()   SpO2:  [95 %-99 %]      Physical Exam  Vitals and nursing note reviewed.   Constitutional:       General: She is not in acute distress.     Appearance: She is not diaphoretic.   HENT:      Head: Atraumatic.      Mouth/Throat:      Mouth: Mucous membranes are moist.      Pharynx: Oropharynx is clear.   Eyes:      General: No scleral icterus.     Extraocular Movements: Extraocular movements intact.   Cardiovascular:      Rate and Rhythm: Normal rate and regular rhythm.      Heart sounds: No murmur heard.  Pulmonary:      Effort: No respiratory distress.      Breath sounds: No wheezing.   Abdominal:      General: Abdomen is flat. There is no distension.      Palpations: Abdomen is soft.      Tenderness: There is no abdominal  tenderness.   Musculoskeletal:      Right lower leg: No edema.      Left lower leg: No edema.   Skin:     General: Skin is warm and dry.      Capillary Refill: Capillary refill takes less than 2 seconds.      Coloration: Skin is not jaundiced or pale.   Neurological:      Mental Status: She is alert and oriented to person, place, and time.      Comments: CNII-XII grossly intact    Psychiatric:         Behavior: Behavior normal.         LABS/MICRO/MEDS/DIAGNOSTICS     LABS  CBC  Recent Labs     01/10/24  0939   WBC 7.91   RBC 4.81   HGB 15.5   HCT 46.5   MCV 96.7*   MCH 32.2*   MCHC 33.3   RDW 13.9        Coags  Recent Labs     01/10/24  1625   INR 1.0     Cardiac  Recent Labs     01/10/24  0939 01/10/24  1408 01/10/24  1625   TROPONINI 0.078* 0.126* 0.104*     ABG/Lactate  Recent Labs     01/10/24  1124   LACTIC 1.4       BMP  Recent Labs     01/10/24  0939      K 3.7   CHLORIDE 111*   CO2 22*   BUN 16.9   CREATININE 1.11*   GLUCOSE 110   CALCIUM 9.2   MG 2.00     LFTs  Recent Labs     01/10/24  0939   ALBUMIN 4.1   GLOBULIN 3.0   ALKPHOS 78   ALT 19   AST 22   BILITOT 0.8     Lipid  Recent Labs     01/10/24  0939   CHOL 265*   TRIG 98   .00*   VLDL 20   HDL 70*     Diabetes  Recent Labs     01/10/24  0939   GLUCOSE 110        INTAKE/OUTPUT  No intake or output data in the 24 hours ending 01/11/24 0522     MICROBIOLOGY  Microbiology Results (last 7 days)       Procedure Component Value Units Date/Time    Blood culture #1 **CANNOT BE ORDERED STAT** [[id number]] Collected: 01/10/24 1124    Order Status: Resulted Specimen: Blood from Antecubital, Left Updated: 01/10/24 1128    Blood culture #2 **CANNOT BE ORDERED STAT** [[id number]] Collected: 01/10/24 1124    Order Status: Resulted Specimen: Blood from Hand, Left Updated: 01/10/24 1128    Urine culture [[id number]] Collected: 01/10/24 1002    Order Status: Sent Specimen: Urine Updated: 01/10/24 1013             MEDICATIONS   NIFEdipine  30 mg Oral  "QHS         INFUSIONS   heparin (porcine) in D5W 12 Units/kg/hr (01/11/24 0000)        DIAGNOSTIC TESTS  MRI Brain W WO Contrast   Final Result      1. No acute intracranial abnormality.   2. Moderate chronic microvascular ischemic changes.         Electronically signed by: Micki Ambriz   Date:    01/10/2024   Time:    15:52      CTA Head and Neck (xpd)   Final Result      No large vessel occlusion or flow-limiting stenosis.         Electronically signed by: Micki Ambriz   Date:    01/10/2024   Time:    12:34      CT Head Without Contrast   Final Result      No acute intracranial process identified.         Electronically signed by: Kyler Boo   Date:    01/10/2024   Time:    10:52      X-Ray Chest AP Portable   Final Result      No acute chest disease is identified.         Electronically signed by: Dawson Frazier   Date:    01/10/2024   Time:    10:30           No results found for: "EF"       ASSESSMENT/PLAN     HTN Emergency   BP gradually improving overnight   Received nifedipine last night, will continue nightly   BP still not at goal but close, will monitor BP throughout the day and likely increase nifedipine dose to 60mg  Avoid hctz in setting of gout hx  Patient requests not to give amlodipine or lisinopril as these meds "make my chest feel weird" but denies hx of anaphylaxis or edema of face/throat or legs  PRN labetalol, hydralazine   Secondary workup reveals: K, Na, TSH/T4 wnl; Cr mildly elevated at 1.1 with 1+ proteinuria      ACS - NSTEMI  Initial Troponin 0.072, then 0.126, improving to 0.104  EKG without ROSSI, inverted T waves in V5, V6, no prior EKGs  Denies ever having chest pain and currently without chest pain   Likely type II NSTEMI, will start heparin drip for 48hrs  Callie scan today    TIA  CTA head/neck without evidence of hemorrhage, vascular occulusion, or stenosis   MRI brain significant for chronic microvascular changes  ABCD2 score: 2, low risk   Echo negative for shunt, " EF70%    ADOLFO  Cr mildly elevated at 1.1 on admit, no baseline  Avoid IVF in setting of HTN emergency  CTM    HLD    Will start statin   A1c 5.4%      Access: pIV  Diet: heart healthy  DVT Prophylaxis: heparin drip       Anticipated discharge and disposition: BP control, kaylee scan, continue heparin   __________________________________________________________________________    Ethel Turner MD  [facility], [facility]

## 2024-01-12 VITALS
BODY MASS INDEX: 28.32 KG/M2 | WEIGHT: 170 LBS | RESPIRATION RATE: 20 BRPM | SYSTOLIC BLOOD PRESSURE: 142 MMHG | HEART RATE: 65 BPM | OXYGEN SATURATION: 95 % | DIASTOLIC BLOOD PRESSURE: 80 MMHG | HEIGHT: 65 IN | TEMPERATURE: 99 F

## 2024-01-12 LAB
ALBUMIN SERPL-MCNC: 3.7 G/DL (ref 3.4–4.8)
ALBUMIN/GLOB SERPL: 1.4 RATIO (ref 1.1–2)
ALP SERPL-CCNC: 63 UNIT/L (ref 40–150)
ALT SERPL-CCNC: 20 UNIT/L (ref 0–55)
APTT PPP: 60.7 SECONDS (ref 23.2–33.7)
AST SERPL-CCNC: 27 UNIT/L (ref 5–34)
BACTERIA UR CULT: NORMAL
BASOPHILS # BLD AUTO: 0.05 X10(3)/MCL
BASOPHILS NFR BLD AUTO: 0.7 %
BILIRUB SERPL-MCNC: 0.7 MG/DL
BUN SERPL-MCNC: 15.4 MG/DL (ref 9.8–20.1)
CALCIUM SERPL-MCNC: 8.8 MG/DL (ref 8.4–10.2)
CHLORIDE SERPL-SCNC: 107 MMOL/L (ref 98–107)
CO2 SERPL-SCNC: 23 MMOL/L (ref 23–31)
CREAT SERPL-MCNC: 0.97 MG/DL (ref 0.55–1.02)
CV STRESS BASE HR: 66 BPM
DIASTOLIC BLOOD PRESSURE: 82 MMHG
EOSINOPHIL # BLD AUTO: 0.05 X10(3)/MCL (ref 0–0.9)
EOSINOPHIL NFR BLD AUTO: 0.7 %
ERYTHROCYTE [DISTWIDTH] IN BLOOD BY AUTOMATED COUNT: 14.2 % (ref 11.5–17)
GFR SERPLBLD CREATININE-BSD FMLA CKD-EPI: >60 MLS/MIN/1.73/M2
GLOBULIN SER-MCNC: 2.7 GM/DL (ref 2.4–3.5)
GLUCOSE SERPL-MCNC: 92 MG/DL (ref 82–115)
HCT VFR BLD AUTO: 42.2 % (ref 37–47)
HGB BLD-MCNC: 14.4 G/DL (ref 12–16)
IMM GRANULOCYTES # BLD AUTO: 0.01 X10(3)/MCL (ref 0–0.04)
IMM GRANULOCYTES NFR BLD AUTO: 0.1 %
LYMPHOCYTES # BLD AUTO: 2.95 X10(3)/MCL (ref 0.6–4.6)
LYMPHOCYTES NFR BLD AUTO: 42 %
MAGNESIUM SERPL-MCNC: 1.9 MG/DL (ref 1.6–2.6)
MCH RBC QN AUTO: 32.7 PG (ref 27–31)
MCHC RBC AUTO-ENTMCNC: 34.1 G/DL (ref 33–36)
MCV RBC AUTO: 95.7 FL (ref 80–94)
MONOCYTES # BLD AUTO: 0.59 X10(3)/MCL (ref 0.1–1.3)
MONOCYTES NFR BLD AUTO: 8.4 %
NEUTROPHILS # BLD AUTO: 3.38 X10(3)/MCL (ref 2.1–9.2)
NEUTROPHILS NFR BLD AUTO: 48.1 %
NRBC BLD AUTO-RTO: 0 %
NUC REST EJECTION FRACTION: 82
NUC STRESS EJECTION FRACTION: 86 %
OHS CV CPX 85 PERCENT MAX PREDICTED HEART RATE MALE: 129
OHS CV CPX ESTIMATED METS: 2
OHS CV CPX MAX PREDICTED HEART RATE: 152
OHS CV CPX PATIENT IS FEMALE: 1
OHS CV CPX PATIENT IS MALE: 0
OHS CV CPX PEAK DIASTOLIC BLOOD PRESSURE: 121 MMHG
OHS CV CPX PEAK HEAR RATE: 139 BPM
OHS CV CPX PEAK RATE PRESSURE PRODUCT: NORMAL
OHS CV CPX PEAK SYSTOLIC BLOOD PRESSURE: 197 MMHG
OHS CV CPX PERCENT MAX PREDICTED HEART RATE ACHIEVED: 95
OHS CV CPX RATE PRESSURE PRODUCT PRESENTING: NORMAL
PHOSPHATE SERPL-MCNC: 2.7 MG/DL (ref 2.3–4.7)
PLATELET # BLD AUTO: 157 X10(3)/MCL (ref 130–400)
PLATELETS.RETICULATED NFR BLD AUTO: 11.7 % (ref 0.9–11.2)
PMV BLD AUTO: 13.3 FL (ref 7.4–10.4)
POTASSIUM SERPL-SCNC: 3.5 MMOL/L (ref 3.5–5.1)
PROT SERPL-MCNC: 6.4 GM/DL (ref 5.8–7.6)
RBC # BLD AUTO: 4.41 X10(6)/MCL (ref 4.2–5.4)
SODIUM SERPL-SCNC: 140 MMOL/L (ref 136–145)
STRESS ECHO POST EXERCISE DUR MIN: 3 MINUTES
STRESS ECHO POST EXERCISE DUR SEC: 0 SECONDS
SYSTOLIC BLOOD PRESSURE: 157 MMHG
WBC # SPEC AUTO: 7.03 X10(3)/MCL (ref 4.5–11.5)

## 2024-01-12 PROCEDURE — 94761 N-INVAS EAR/PLS OXIMETRY MLT: CPT

## 2024-01-12 PROCEDURE — 25000003 PHARM REV CODE 250

## 2024-01-12 PROCEDURE — 80053 COMPREHEN METABOLIC PANEL: CPT

## 2024-01-12 PROCEDURE — 85730 THROMBOPLASTIN TIME PARTIAL: CPT | Performed by: INTERNAL MEDICINE

## 2024-01-12 PROCEDURE — 63600175 PHARM REV CODE 636 W HCPCS: Performed by: INTERNAL MEDICINE

## 2024-01-12 PROCEDURE — 85025 COMPLETE CBC W/AUTO DIFF WBC: CPT

## 2024-01-12 PROCEDURE — 84100 ASSAY OF PHOSPHORUS: CPT

## 2024-01-12 PROCEDURE — 83735 ASSAY OF MAGNESIUM: CPT

## 2024-01-12 RX ORDER — LISINOPRIL 10 MG/1
20 TABLET ORAL DAILY
Status: DISCONTINUED | OUTPATIENT
Start: 2024-01-12 | End: 2024-01-12 | Stop reason: HOSPADM

## 2024-01-12 RX ORDER — LISINOPRIL 20 MG/1
20 TABLET ORAL DAILY
Qty: 60 TABLET | Refills: 0 | Status: SHIPPED | OUTPATIENT
Start: 2024-01-13 | End: 2024-01-22

## 2024-01-12 RX ORDER — NIFEDIPINE 60 MG/1
60 TABLET, EXTENDED RELEASE ORAL NIGHTLY
Qty: 60 TABLET | Refills: 0 | Status: SHIPPED | OUTPATIENT
Start: 2024-01-12 | End: 2024-02-07 | Stop reason: ALTCHOICE

## 2024-01-12 RX ORDER — REGADENOSON 0.08 MG/ML
0.4 INJECTION, SOLUTION INTRAVENOUS ONCE
Status: COMPLETED | OUTPATIENT
Start: 2024-01-12 | End: 2024-01-12

## 2024-01-12 RX ADMIN — REGADENOSON 0.4 MG: 0.08 INJECTION, SOLUTION INTRAVENOUS at 11:01

## 2024-01-12 RX ADMIN — HEPARIN SODIUM 30 UNITS/KG/HR: 10000 INJECTION, SOLUTION INTRAVENOUS at 07:01

## 2024-01-12 RX ADMIN — LISINOPRIL 20 MG: 10 TABLET ORAL at 10:01

## 2024-01-12 NOTE — PROGRESS NOTES
[facility] Medicine  Progress Note      Patient Name: Tiffanie Rowan  : 1955  MRN: [id number]  Patient Class: IP- Inpatient   Admission Date: 1/10/2024   Length of Stay: 2  Admitting Service: Hospital Medicine  Attending Physician: Priscilla Bearden MD  PCP: Melanie, Primary Doctor    CHIEF COMPLAINT   Hospital follow up    HOSPITAL COURSE     Brief HPI:  68 y.o. female with HTN and gout presents to ED for right sided upper and lower extremity onset this morning, lasting several minutes. Reports resolution of symptoms at this time. Associated symptoms include headache and blurred vision. Denies chest pain, falls, dysuria, recent illness, fever, n/v. Reports has hx of htn but stopped taking BP meds approx 3 years ago.      Interval History:  NAEON. BP continues to improve. Denies chest pain, SOB, weakness, neurological deficits overnight    OBJECTIVE/PHYSICAL EXAM     VITAL SIGNS (MOST RECENT):  Temp: 97.6 °F (36.4 °C) (24 1156)  Pulse: 72 (24 1156)  Resp: 20 (24 1106)  BP: (!) 168/90 (24 1156)  SpO2: 96 % (24 1317) VITAL SIGNS (24 HOUR RANGE):  Temp:  [97.6 °F (36.4 °C)-98.3 °F (36.8 °C)]   Pulse:  [72-90]   Resp:  [20]   BP: (157-177)/(76-90)   SpO2:  [88 %-96 %]      Physical Exam  Vitals and nursing note reviewed.   Constitutional:       General: She is not in acute distress.     Appearance: She is not diaphoretic.   HENT:      Head: Atraumatic.      Mouth/Throat:      Mouth: Mucous membranes are moist.      Pharynx: Oropharynx is clear.   Eyes:      General: No scleral icterus.     Extraocular Movements: Extraocular movements intact.   Cardiovascular:      Rate and Rhythm: Normal rate and regular rhythm.      Heart sounds: No murmur heard.  Pulmonary:      Effort: No respiratory distress.      Breath sounds: No wheezing.   Abdominal:      General: Abdomen is flat. There is no distension.      Palpations: Abdomen is soft.      Tenderness: There is no  abdominal tenderness.   Musculoskeletal:      Right lower leg: No edema.      Left lower leg: No edema.   Skin:     General: Skin is warm and dry.      Capillary Refill: Capillary refill takes less than 2 seconds.      Coloration: Skin is not jaundiced or pale.   Neurological:      Mental Status: She is alert and oriented to person, place, and time.      Comments: CNII-XII grossly intact    Psychiatric:         Behavior: Behavior normal.         LABS/MICRO/MEDS/DIAGNOSTICS     LABS  CBC  Recent Labs     01/11/24  0531 01/12/24  0406   WBC 6.76 7.03   RBC 4.31 4.41   HGB 14.0 14.4   HCT 41.7 42.2   MCV 96.8* 95.7*   MCH 32.5* 32.7*   MCHC 33.6 34.1   RDW 13.9 14.2    157     Coags  Recent Labs     01/10/24  1625   INR 1.0     Cardiac  Recent Labs     01/10/24  0939 01/10/24  1408 01/10/24  1625   TROPONINI 0.078* 0.126* 0.104*     ABG/Lactate  Recent Labs     01/10/24  1124   LACTIC 1.4       BMP  Recent Labs     01/11/24  0531 01/12/24  0406    140   K 3.9 3.5   CHLORIDE 108* 107   CO2 20* 23   BUN 19.5 15.4   CREATININE 1.20* 0.97   GLUCOSE 128* 92   CALCIUM 9.1 8.8   MG 2.10 1.90   PHOS 3.6 2.7     LFTs  Recent Labs     01/11/24  0531 01/12/24  0406   ALBUMIN 3.8 3.7   GLOBULIN 2.9 2.7   ALKPHOS 68 63   ALT 17 20   AST 21 27   BILITOT 0.6 0.7     Lipid  Recent Labs     01/10/24  0939   CHOL 265*   TRIG 98   .00*   VLDL 20   HDL 70*     Diabetes  Recent Labs     01/10/24  0939 01/11/24  0531 01/12/24  0406   HGBA1C  --  5.4  --    GLUCOSE 110 128* 92        INTAKE/OUTPUT  No intake or output data in the 24 hours ending 01/12/24 1325     MICROBIOLOGY  Microbiology Results (last 7 days)       Procedure Component Value Units Date/Time    Urine culture [[id number]] Collected: 01/10/24 1002    Order Status: Completed Specimen: Urine Updated: 01/12/24 0808     Urine Culture No Significant Growth    Blood culture #1 **CANNOT BE ORDERED STAT** [[id number]]  (Normal) Collected: 01/10/24 1124    Order  "Status: Completed Specimen: Blood from Antecubital, Left Updated: 01/11/24 1701     CULTURE, BLOOD ([facility]) No Growth At 24 Hours    Blood culture #2 **CANNOT BE ORDERED STAT** [[id number]]  (Normal) Collected: 01/10/24 1124    Order Status: Completed Specimen: Blood from Hand, Left Updated: 01/11/24 1701     CULTURE, BLOOD ([facility]) No Growth At 24 Hours             MEDICATIONS   lisinopriL  20 mg Oral Daily    NIFEdipine  60 mg Oral QHS         INFUSIONS         DIAGNOSTIC TESTS  MRI Brain W WO Contrast   Final Result      1. No acute intracranial abnormality.   2. Moderate chronic microvascular ischemic changes.         Electronically signed by: Micki Ambriz   Date:    01/10/2024   Time:    15:52      CTA Head and Neck (xpd)   Final Result      No large vessel occlusion or flow-limiting stenosis.         Electronically signed by: Micki Ambriz   Date:    01/10/2024   Time:    12:34      CT Head Without Contrast   Final Result      No acute intracranial process identified.         Electronically signed by: Kyler Boo   Date:    01/10/2024   Time:    10:52      X-Ray Chest AP Portable   Final Result      No acute chest disease is identified.         Electronically signed by: Dawson Frazier   Date:    01/10/2024   Time:    10:30           No results found for: "EF"       ASSESSMENT/PLAN     HTN Emergency   BP gradually improving overnight   Received nifedipine last night, will continue nightly   BP still not at goal but close, nifedipine 60mg was given last night  Will start lisinopril 20mg today   Avoid hctz in setting of gout hx  Patient requests not to give amlodipine or lisinopril as these meds "make my chest feel weird" but denies hx of anaphylaxis or edema of face/throat or legs  PRN labetalol, hydralazine   Secondary workup reveals: K, Na, TSH/T4 wnl; Cr mildly elevated at 1.1 with 1+ proteinuria      ACS - NSTEMI  Initial Troponin 0.072, then 0.126, improving to 0.104  EKG without ROSSI, inverted T waves " in V5, V6, no prior EKGs  Denies ever having chest pain and currently without chest pain   Likely type II NSTEMI, will start heparin drip for 48hrs  Callie scan today    TIA  CTA head/neck without evidence of hemorrhage, vascular occulusion, or stenosis   MRI brain significant for chronic microvascular changes  ABCD2 score: 2, low risk   Echo negative for shunt, EF70%    ADOLFO  Cr mildly elevated at 1.1 on admit, no baseline  Avoid IVF in setting of HTN emergency  Renal indices have now normalized   CTM    HLD    Will start statin   A1c 5.4%      Access: pIV  Diet: heart healthy  DVT Prophylaxis: heparin drip       Anticipated discharge and disposition: BP control, callie scan today, discontinue heparin   __________________________________________________________________________    Ethel Turner MD  [facility] , [facility]

## 2024-01-13 NOTE — DISCHARGE SUMMARY
[facility] Discharge Summary    Admitting Physician: Priscilla Bearden MD  Attending Physician: No att. providers found  Date of Admit: 1/10/2024  Date of Discharge: 1/13/2024    Condition: Stable  Outcome: Condition has improved and patient is now ready for discharge.  DISPOSITION: Home or Self Care    Discharge Diagnoses     Principal Problem:  Hypertensive emergency  Active Hospital Problems    Diagnosis  POA    *Hypertensive emergency [I16.1]  Yes    TIA (transient ischemic attack) [G45.9]  Yes    Elevated troponin [R79.89]  Yes    NSTEMI (non-ST elevated myocardial infarction) [I21.4]  Yes    Uncontrolled hypertension [I10]  Yes      Resolved Hospital Problems   No resolved problems to display.     Consultants and Procedures     Consultants:  None    Procedures:   * No surgery found *     Brief Admission History      68 y.o. female with HTN and gout presents to ED for right sided upper and lower extremity onset this morning, lasting several minutes. Reports resolution of symptoms at this time. Associated symptoms include headache and blurred vision. Denies chest pain, falls, dysuria, recent illness, fever, n/v. Reports has hx of htn but stopped taking BP meds approx 3 years ago.       Hospital Course with Pertinent Findings     Patient admitted for hypertensive emergency evident by TIA and elevated troponin. Initial BP was 237/143. She received labetalol push in ED with appropriate improvement in BP therefore decision was made to admit to floor. Initial troponin of 0.07, reaching max of 0.1 then downtrended as BP improved. Heparin was completed for 48hrs. Callie scan revealed no perfusion abnormalities. BP meds slowing added on and titrated until acceptable BP was reached. Hospital course was uncomplicated. She remained symptomatic and without chest pain or recurrent TIA throughout admission. She was discharged with BP medication and cardiology referral.    Discharge physical exam:  Vitals  BP: (!)  "142/80  Temp: 98.5 °F (36.9 °C)  Temp Source: Oral  Pulse: 65  Resp: 20  SpO2: 95 %  Height: 5' 5" (165.1 cm)  Weight: 77.1 kg (169 lb 15.6 oz)    Physical Exam  Vitals and nursing note reviewed.   Constitutional:       General: She is not in acute distress.     Appearance: She is not diaphoretic.   HENT:      Head: Atraumatic.      Mouth/Throat:      Mouth: Mucous membranes are moist.      Pharynx: Oropharynx is clear.   Eyes:      General: No scleral icterus.     Extraocular Movements: Extraocular movements intact.   Cardiovascular:      Rate and Rhythm: Normal rate and regular rhythm.      Heart sounds: No murmur heard.  Pulmonary:      Effort: No respiratory distress.      Breath sounds: No wheezing.   Abdominal:      General: Abdomen is flat. There is no distension.      Palpations: Abdomen is soft.      Tenderness: There is no abdominal tenderness.   Musculoskeletal:      Right lower leg: No edema.      Left lower leg: No edema.   Skin:     General: Skin is warm and dry.      Capillary Refill: Capillary refill takes less than 2 seconds.      Coloration: Skin is not jaundiced or pale.   Neurological:      Mental Status: She is alert and oriented to person, place, and time.      Comments: CNII-XII grossly intact    Psychiatric:         Behavior: Behavior normal.         TIME SPENT ON DISCHARGE: 60 minutes    Discharge Medications        Medication List        START taking these medications      lisinopriL 20 MG tablet  Commonly known as: PRINIVIL,ZESTRIL  Take 1 tablet (20 mg total) by mouth once daily.     NIFEdipine 60 MG (OSM) 24 hr tablet  Commonly known as: PROCARDIA-XL  Take 1 tablet (60 mg total) by mouth every evening.               Where to Get Your Medications        These medications were sent to "[id number] - [address] AT [address] [address]      Phone: [phone]   lisinopriL 20 MG tablet  NIFEdipine 60 MG (OSM) 24 " hr tablet         Discharge Information:     Complete all medications as prescribed.     ED precautions discussed including but not limited to worsening chest pain, vision changes, syncope, weakness, difficulty speaking, shortness of breath, BP >180/120     Maintain the following appointments:   Follow-up Information       [facility] Follow up.    Specialty: Family Medicine  Contact information:  [address] [facility] [address] [phone]             [facility] .    Specialty: Cardiology  Contact information:  [address] [facility] [address] [phone]                       Ethel Turner MD  [facility], [facility]

## 2024-01-15 LAB
BACTERIA BLD CULT: NORMAL
BACTERIA BLD CULT: NORMAL

## 2024-01-22 ENCOUNTER — OFFICE VISIT (OUTPATIENT)
Dept: FAMILY MEDICINE | Facility: CLINIC | Age: 69
End: 2024-01-22
Payer: MEDICARE

## 2024-01-22 VITALS
SYSTOLIC BLOOD PRESSURE: 151 MMHG | HEIGHT: 65 IN | DIASTOLIC BLOOD PRESSURE: 85 MMHG | HEART RATE: 60 BPM | BODY MASS INDEX: 30.52 KG/M2 | WEIGHT: 183.19 LBS | OXYGEN SATURATION: 99 % | RESPIRATION RATE: 18 BRPM | TEMPERATURE: 98 F

## 2024-01-22 DIAGNOSIS — M54.2 NECK PAIN: ICD-10-CM

## 2024-01-22 DIAGNOSIS — I10 UNCONTROLLED HYPERTENSION: Primary | ICD-10-CM

## 2024-01-22 PROBLEM — I21.4 NSTEMI (NON-ST ELEVATED MYOCARDIAL INFARCTION): Status: RESOLVED | Noted: 2024-01-11 | Resolved: 2024-01-22

## 2024-01-22 PROBLEM — R79.89 ELEVATED TROPONIN: Status: RESOLVED | Noted: 2024-01-11 | Resolved: 2024-01-22

## 2024-01-22 PROBLEM — I16.1 HYPERTENSIVE EMERGENCY: Status: RESOLVED | Noted: 2024-01-11 | Resolved: 2024-01-22

## 2024-01-22 PROBLEM — E66.9 OBESITY: Status: ACTIVE | Noted: 2024-01-22

## 2024-01-22 PROCEDURE — 99214 OFFICE O/P EST MOD 30 MIN: CPT | Mod: PBBFAC

## 2024-01-22 RX ORDER — OLMESARTAN MEDOXOMIL 20 MG/1
20 TABLET ORAL DAILY
Qty: 90 TABLET | Refills: 3 | Status: SHIPPED | OUTPATIENT
Start: 2024-01-24 | End: 2024-02-07 | Stop reason: ALTCHOICE

## 2024-01-22 NOTE — PROGRESS NOTES
[facility] Clinic Progress Note    ID:  Tiffanie Rowan   MRN:  [id number]     1/22/2024    Chief Complaint:    Chief Complaint   Patient presents with    Headache    Hospital Follow Up     1/10/2024     History of Present Illness:  Tiffanie Rowan is a 68 y.o. female who presents to [facility] FM clinic for 3 day hospital follow up. She was admitted for HTN emergency indicated by elevated troponin and TIA. Callie scan negative for perfusion abnormalities. Discharged on 01/12/2024. She was started on lisinopril and nifedipine with improvement in BP. She has brought in 8 BP readings today ranging from 128/80 (morning) - 181/105 (evening). States she takes BP meds in the morning and reports headaches in the evenings. Denies chest pain, weakness, focal deficits. Reports she was previously intolerant to amlodipine. Had previously tried higher doses of lisinopril and did not tolerate well. Has hx of gout.     She also reports left sided posterior neck pain. Onset during hospitalization after waking up following first night in hospital. Intermittent in nature. Worse in the morning. Denies trauma. Denies pain at present. Requests stretching exercises.      Medical History  Review of patient's allergies indicates:   Allergen Reactions    Ciprofloxacin Itching    Iodine Itching, Swelling and Hives    Cephalexin Hives     History reviewed. No pertinent past medical history.  Social Hx:  reports that she quit smoking 12 days ago. Her smoking use included cigarettes. She has been exposed to tobacco smoke. She has never used smokeless tobacco.  FH: family history is not on file.    Medication List with Changes/Refills   New Medications    OLMESARTAN (BENICAR) 20 MG TABLET    Take 1 tablet (20 mg total) by mouth once daily.   Current Medications    NIFEDIPINE (PROCARDIA-XL) 60 MG (OSM) 24 HR TABLET    Take 1 tablet (60 mg total) by mouth every evening.   Discontinued Medications    LISINOPRIL (PRINIVIL,ZESTRIL) 20 MG TABLET    Take 1 tablet (20 mg  "total) by mouth once daily.       Review of Systems:  ROS reviewed with patient and updated below.  Review of Systems   Constitutional:  Negative for fever.   HENT:  Negative for congestion.    Respiratory:  Negative for shortness of breath.    Cardiovascular:  Negative for chest pain.   Gastrointestinal:  Negative for abdominal pain.   Genitourinary:  Negative for dysuria.   Musculoskeletal:  Positive for neck pain.   Skin:  Negative for color change.   Neurological:  Positive for headaches. Negative for dizziness.   Pertinent positives and negatives as mentioned in HPI    Objective:  Vitals:    01/22/24 1357   BP: (!) 151/85   BP Location: Right arm   Patient Position: Sitting   BP Method: Large (Automatic)   Pulse: 60   Resp: 18   Temp: 98 °F (36.7 °C)   TempSrc: Oral   SpO2: 99%   Weight: 83.1 kg (183 lb 3.2 oz)   Height: 5' 5" (1.651 m)      Physical Exam  Vitals reviewed.   Eyes:      Extraocular Movements: Extraocular movements intact.   Cardiovascular:      Rate and Rhythm: Normal rate and regular rhythm.      Heart sounds: No murmur heard.  Pulmonary:      Effort: Pulmonary effort is normal.      Breath sounds: No stridor. No wheezing.   Musculoskeletal:      Cervical back: Full passive range of motion without pain and normal range of motion. No edema, erythema, signs of trauma, rigidity or torticollis. No pain with movement, spinous process tenderness or muscular tenderness. Normal range of motion.   Skin:     General: Skin is warm and dry.      Coloration: Skin is not pale.   Neurological:      General: No focal deficit present.      Mental Status: She is alert and oriented to person, place, and time.      Gait: Gait normal.   Psychiatric:         Behavior: Behavior normal.          Assessment/Plan:  Tiffanie was seen today for headache and hospital follow up.    Diagnoses and all orders for this visit:    Uncontrolled hypertension  -     discontinue lisinopril d/t to patient intolerance to high doses and " switch to olmesartan (BENICAR) 20 MG tablet; Take 1 tablet (20 mg total) by mouth once daily.  -      Provided patient BP log to return with at next visit   -      importance of dietary modifications discussed with patient and educational handout for DASH diet provided.  -      ED precautions discussed including weakness, difficulty speaking, vision changes, chest pain, shortness of breath, syncope.   -      establish with PCP at next visit with plan for healthcare maintenance      Neck Pain  -     likely MSK etiology secondary to incorrectly sleeping positions  -     stretching exercises education provided to patient.     Follow up in about 3 weeks (around 2/12/2024) for hypertension and establish with PCP.    Future Appointments   Date Time Provider Department Center   2/20/2024  1:40 PM Ethel Turner MD [facility] José Miguel Turner MD  [facility], -II

## 2024-01-29 NOTE — PROGRESS NOTES
I reviewed History, PE, A/P and medical record.  Services provided in outpatient department of a teaching hospital/facility, I was immediately available.  I agree with resident, care reasonable and necessary.   I evaluated the patient with resident at time of visit, participated in key parts of H/P and management was discussed.    Called to check on pt  Systolics 140-170s on benicar 20 and procardia XL 60  Just started taking both in AM 5 days ago since procardia said to take at night  Still with dull HA nad feels little more fatigue than usual, no CP  Wants to start exercise but advised  to wait until BP is down    Disc sleep study with sleep medicine physician, perhaps [facility] sleep lab, does snore, no witnessed apnea    Likely will need benicar 40 and    Consider B12 and D levels    Janae Pugh MD  [facility] - [facility]

## 2024-02-02 ENCOUNTER — PATIENT MESSAGE (OUTPATIENT)
Dept: FAMILY MEDICINE | Facility: CLINIC | Age: 69
End: 2024-02-02
Payer: MEDICARE

## 2024-02-07 ENCOUNTER — OFFICE VISIT (OUTPATIENT)
Dept: CARDIOLOGY | Facility: CLINIC | Age: 69
End: 2024-02-07
Payer: MEDICARE

## 2024-02-07 VITALS
RESPIRATION RATE: 20 BRPM | DIASTOLIC BLOOD PRESSURE: 72 MMHG | WEIGHT: 183.63 LBS | TEMPERATURE: 98 F | HEART RATE: 72 BPM | BODY MASS INDEX: 33.79 KG/M2 | SYSTOLIC BLOOD PRESSURE: 136 MMHG | HEIGHT: 62 IN | OXYGEN SATURATION: 100 %

## 2024-02-07 DIAGNOSIS — M10.9 GOUT, UNSPECIFIED CAUSE, UNSPECIFIED CHRONICITY, UNSPECIFIED SITE: ICD-10-CM

## 2024-02-07 DIAGNOSIS — I10 UNCONTROLLED HYPERTENSION: Primary | ICD-10-CM

## 2024-02-07 DIAGNOSIS — E78.5 HYPERLIPIDEMIA LDL GOAL <70: ICD-10-CM

## 2024-02-07 DIAGNOSIS — E78.5 HYPERLIPIDEMIA, UNSPECIFIED HYPERLIPIDEMIA TYPE: ICD-10-CM

## 2024-02-07 DIAGNOSIS — I21.4 NSTEMI (NON-ST ELEVATED MYOCARDIAL INFARCTION): ICD-10-CM

## 2024-02-07 PROCEDURE — 99214 OFFICE O/P EST MOD 30 MIN: CPT | Mod: PBBFAC | Performed by: INTERNAL MEDICINE

## 2024-02-07 RX ORDER — ROSUVASTATIN CALCIUM 40 MG/1
40 TABLET, COATED ORAL NIGHTLY
Qty: 90 TABLET | Refills: 3 | Status: SHIPPED | OUTPATIENT
Start: 2024-02-07 | End: 2025-02-06

## 2024-02-07 RX ORDER — LOSARTAN POTASSIUM 50 MG/1
50 TABLET ORAL DAILY
Qty: 90 TABLET | Refills: 3 | Status: SHIPPED | OUTPATIENT
Start: 2024-02-07 | End: 2025-02-06

## 2024-02-07 RX ORDER — CHLORTHALIDONE 25 MG/1
25 TABLET ORAL DAILY
Qty: 30 TABLET | Refills: 11 | Status: SHIPPED | OUTPATIENT
Start: 2024-02-07 | End: 2025-02-06

## 2024-02-07 NOTE — PROGRESS NOTES
02/07/2024 8:20 AM    Subjective:     CHIEF COMPLAINT:   Chief Complaint   Patient presents with    Referral    Hypertension    Dizziness    Denies chestpains or Shortness                           HPI:    Ms. Tiffanie Rowan is a 68 y.o. female.      Today the patient comes for a new patient evaluation.  The patient has history of HTN and gout, presents for new patient eval following recent hospitalization for HTN emergency with elevated troponins and TIA (Sx unilateral upper and lower extremity paralysis, slurred speech, blurry vision); pt discharged on 1/12/2024. Patient was started on nifedipine and lisinopril with improvement in BP. Lisinopril was d/c by PCP and she was started on Olmesartan. Pt reports previous intolerance of amlodipine. Patient likely was not started on ASA at that time 2/2 prior history anaphylaxis with aspirin-shellfish combination. Patient mentions that she was previously on a ARB-Thiazide combo but had to switch 2/2 poor prescription coverage. Patient reports quitting smoking and drinking following her TIA.     CP:  The patient has no chest discomfort.      SOB:  The patient denies shortness of breath Has ALBERT    EDEMA:  The patient denies edema.      ORTHOPNEA:  The patient denies orthopnea.  No PND.      SYNCOPE:  The patient denies near syncope.  No syncope.   Denies getting lightheaded.    PALPITATIONS:  The patient has no palpitations.    LEVEL OF EXERTION:  The patient exercises.  The patient does not have symptoms with this level of exertion.  The patient's level of exertion is excellent.  METS:  The patient does the following activities:    climb stairs (4 METS), play with dog (4 METS), and sweep garage (4 METS)    Past Medical History    Patient Active Problem List   Diagnosis    TIA (transient ischemic attack)    Uncontrolled hypertension    Obesity    Hyperlipidemia    Gout       Surgical History    Past Surgical History:   Procedure Laterality Date    atopic      LYMPH NODE  BIOPSY/EXCISION      TONSILLECTOMY      TUBAL LIGATION         Social History     Socioeconomic History    Marital status: Single   Tobacco Use    Smoking status: Former     Current packs/day: 0.00     Types: Cigarettes     Quit date: 1/10/2024     Years since quittin.0     Passive exposure: Past    Smokeless tobacco: Never   Substance and Sexual Activity    Alcohol use: Not Currently    Drug use: Never     Social Determinants of Health     Financial Resource Strain: High Risk (2024)    Overall Financial Resource Strain (CARDIA)     Difficulty of Paying Living Expenses: Very hard   Food Insecurity: Food Insecurity Present (2024)    Hunger Vital Sign     Worried About Running Out of Food in the Last Year: Often true     Ran Out of Food in the Last Year: Often true   Transportation Needs: Unmet Transportation Needs (2024)    PRAPARE - Transportation     Lack of Transportation (Medical): No     Lack of Transportation (Non-Medical): Yes   Physical Activity: Patient Declined (2024)    Exercise Vital Sign     Days of Exercise per Week: Patient declined     Minutes of Exercise per Session: Patient declined   Stress: Stress Concern Present (2024)    [employer] - Occupational Stress Questionnaire     Feeling of Stress : Rather much   Social Connections: Unknown (2024)    Social Connection and Isolation Panel [NHANES]     Frequency of Communication with Friends and Family: Twice a week     Frequency of Social Gatherings with Friends and Family: Patient declined     Active Member of Clubs or Organizations: No     Attends Club or Organization Meetings: Patient declined     Marital Status:    Housing Stability: Low Risk  (2024)    Housing Stability Vital Sign     Unable to Pay for Housing in the Last Year: No     Number of Places Lived in the Last Year: 1     Unstable Housing in the Last Year: No       Family History   Problem Relation Age of Onset     "Stroke Mother     Hypertension Mother     Asthma Father     Hypertension Father      Review of patient's allergies indicates:   Allergen Reactions    Ciprofloxacin Itching    Iodine Itching, Swelling and Hives    Lisinopril Other (See Comments)     Cough     Cephalexin Hives       Current Medications    Current Outpatient Medications   Medication Instructions    chlorthalidone (HYGROTEN) 25 mg, Oral, Daily    losartan (COZAAR) 50 mg, Oral, Daily    rosuvastatin (CRESTOR) 40 mg, Oral, Nightly         ROS:   refer to HPI     Objective:     BP Readings from Last 3 Encounters:   02/07/24 136/72   01/22/24 (!) 151/85   01/12/24 (!) 142/80        Pulse Readings from Last 3 Encounters:   02/07/24 72   01/22/24 60   01/12/24 65        Temp Readings from Last 3 Encounters:   02/07/24 98.2 °F (36.8 °C) (Oral)   01/22/24 98 °F (36.7 °C) (Oral)   01/12/24 98.5 °F (36.9 °C) (Oral)       Wt Readings from Last 3 Encounters:   02/07/24 83.3 kg (183 lb 10.3 oz)   01/22/24 83.1 kg (183 lb 3.2 oz)   01/12/24 77.1 kg (169 lb 15.6 oz)         PE:  Blood pressure 136/72, pulse 72, temperature 98.2 °F (36.8 °C), temperature source Oral, resp. rate 20, height 5' 2" (1.575 m), weight 83.3 kg (183 lb 10.3 oz), SpO2 100 %.   GENERAL:  Ambulates  CONSTITUTIONAL:  No acute distress.  Not ill appearing.  NECK:  No cervical adenopathy.  No carotid bruit.  CARDIOVASCULAR:  Normal rate.  Regular rhythm.  No murmur.  PULMONARY:  No respiratory distress.  No wheezing.  No crackles.  ABDOMINAL:  No distention.  No tenderness.  MUSCULOSKELETAL:  No deformity.  No edema  SKIN:  No bruising.  No rash.  NEUROLOGICAL:  Oriented x 3.  No weakness.                                     Home BP Log                                                                                                                                                                                                                                                                             "                                                                                                                                                                          CARDIAC TESTING:  Echocardiogram  Results for orders placed during the hospital encounter of 01/10/24    Echo Saline Bubble? Yes    Interpretation Summary    Left Ventricle: The left ventricle is normal in size. Normal wall thickness. Biplane (2D) method of discs ejection fraction is 70%.    Right Ventricle: Normal right ventricular cavity size. Systolic function is normal.    Left Atrium: Agitated saline study of the atrial septum is negative, suggesting absence of intracardiac shunt at the atrial level.    Aortic Valve: Not well visualized due to poor acoustic window. The aortic valve is a trileaflet valve. There is normal leaflet mobility.    Mitral Valve: There is mild mitral annular calcification present. There is normal leaflet mobility.    Tricuspid Valve: There is mild regurgitation.    Pulmonary Artery: The estimated pulmonary artery systolic pressure is 30 mmHg.    IVC/SVC: Normal venous pressure at 3 mmHg.      Stress Test  Results for orders placed during the hospital encounter of 01/10/24    Nuclear Stress - Cardiology Interpreted    Interpretation Summary    Normal myocardial perfusion scan. There is no evidence of myocardial ischemia or infarction.    The gated perfusion images showed an ejection fraction of 82% at rest. The gated perfusion images showed an ejection fraction of 86% post stress.    The patient reported no chest pain during the stress test.    There were no arrhythmias during stress.    The nuclear stress test is  good quality.  On the nuclear stress test the EF is normal.  If the patient has an echo, the EF on the echo is considered more accurate.    The patient has a low risk nuclear stress test    Nuclear stress test indicates no ischemia.     Coronary Angiogram  No results found for this or any previous visit.    "  Holter Monitor  No cardiac monitor results found for the past 12 months    EKG  Results for orders placed or performed during the hospital encounter of 01/10/24   EKG 12-lead    Collection Time: 01/12/24 11:08 AM    Narrative    Test Reason : NSTEMI    Vent. Rate : 069 BPM     Atrial Rate : 069 BPM     P-R Int : 118 ms          QRS Dur : 082 ms      QT Int : 422 ms       P-R-T Axes : 069 027 046 degrees     QTc Int : 452 ms    Normal sinus rhythm with sinus arrhythmia  Nonspecific T wave abnormality  Abnormal ECG    Confirmed by Liu Blair MD ([id number]) on 1/12/2024 6:15:54 PM    Referred By: [email or web address]   SELF           Confirmed By:Liu Blair MD       Last BMP BMP  Lab Results   Component Value Date     01/12/2024    K 3.5 01/12/2024    CO2 23 01/12/2024    BUN 15.4 01/12/2024    CREATININE 0.97 01/12/2024    CALCIUM 8.8 01/12/2024    EGFRNORACEVR >60 01/12/2024      Last CBC     Lab Results   Component Value Date    WBC 7.03 01/12/2024    HGB 14.4 01/12/2024    HCT 42.2 01/12/2024    MCV 95.7 (H) 01/12/2024     01/12/2024           BNP  No results found for: "BNP"  Last lipids    Lab Results   Component Value Date    CHOL 265 (H) 01/10/2024    HDL 70 (H) 01/10/2024    .00 (H) 01/10/2024    TRIG 98 01/10/2024    TOTALCHOLEST 4 01/10/2024      LFT     Lab Results   Component Value Date    ALT 20 01/12/2024    ALT 17 01/11/2024    ALT 19 01/10/2024    AST 27 01/12/2024    AST 21 01/11/2024    AST 22 01/10/2024       Assessment:     1. Uncontrolled hypertension  - Ambulatory referral/consult to Cardiology  - losartan (COZAAR) 50 MG tablet; Take 1 tablet (50 mg total) by mouth once daily.  Dispense: 90 tablet; Refill: 3  - chlorthalidone (HYGROTEN) 25 MG Tab; Take 1 tablet (25 mg total) by mouth once daily.  Dispense: 30 tablet; Refill: 11  - Comprehensive Metabolic Panel; Future    2. NSTEMI (non-ST elevated myocardial infarction)  - Ambulatory referral/consult to Cardiology    3. " Hyperlipidemia, unspecified hyperlipidemia type  - rosuvastatin (CRESTOR) 40 MG Tab; Take 1 tablet (40 mg total) by mouth every evening.  Dispense: 90 tablet; Refill: 3  - Lipid Panel; Future    4. Hyperlipidemia LDL goal <70    5. Gout, unspecified cause, unspecified chronicity, unspecified site  - Uric Acid; Future    10 Year Cardiovascular Risk:  The ASCVD Risk score (Pedro DK, et al., 2019) failed to calculate for the following reasons:    The patient has a prior MI or stroke diagnosis  BMI:  Body mass index is 33.59 kg/m².  Patient is obese (BMI 30-34.9)  Tobacco:  25+ pack years - Quit 01/24  Alcohol:  previously 3-4 beers per day, quit 01/24  Substance abuse:  none    Last PCP visit:  1/22/2024    Plan:     HTN  Gout   - Given previous success with ARB-Thiazide combo, in combination with the urate lowering properties of Losartan, will start Losartan 50 mg qD and Chlorthalidone 25 mg.   - D/c Benicar and Procardia   - Avoid ACE agents 2/2 c/f cough, angioedema   - RTC for nurse visit in 2 weeks; patient instructed to bring logs and BP machine for calibration   - Patient instructed to have CMP, FLP, Uric acid levels drawn prior to follow up visit    TIA  HLD   - Given patient's prior h/o allergy with ASA (in combination with shellfish), would avoid ASA   - Would consider neurology referral to determine if patient should be started on plavix for TIA   - LDL 170s  - Starting crestor 40  - LDL goal < 70   - r/p FLP prior to follow up     Patient counseled on smoking cessation, reducing alcohol intake, Na restriction, low fat diet, reducing red meat consumption, adequate physical exercise and importance of medication compliance.       FOLLOW UP:  4 months    DO RUDDY Hurley IM PGY-1  [facility] Cardiology     Future Appointments   Date Time Provider Department Center   2/20/2024  1:40 PM Ethel Turner MD [facility] [place]   3/11/2024  1:00 PM NURSE, [facility] CARDIOLOGY [facility] [place]   6/10/2024  8:30 AM  Idania Edwards MD [facility] [place]

## 2024-02-08 NOTE — PROGRESS NOTES
Cardiology attending addendum  Patient was seen and examined with Dr. Tejas Garcia. Agree with plan as outlined above.     Idania Edwards MD  Cardiology-[facility]

## 2024-02-20 ENCOUNTER — OFFICE VISIT (OUTPATIENT)
Dept: FAMILY MEDICINE | Facility: CLINIC | Age: 69
End: 2024-02-20
Payer: MEDICARE

## 2024-02-20 VITALS
SYSTOLIC BLOOD PRESSURE: 122 MMHG | WEIGHT: 180 LBS | RESPIRATION RATE: 18 BRPM | OXYGEN SATURATION: 99 % | TEMPERATURE: 97 F | BODY MASS INDEX: 33.13 KG/M2 | DIASTOLIC BLOOD PRESSURE: 78 MMHG | HEIGHT: 62 IN | HEART RATE: 71 BPM

## 2024-02-20 DIAGNOSIS — Z78.0 ASYMPTOMATIC MENOPAUSAL STATE: ICD-10-CM

## 2024-02-20 DIAGNOSIS — E78.5 HYPERLIPIDEMIA, UNSPECIFIED HYPERLIPIDEMIA TYPE: ICD-10-CM

## 2024-02-20 DIAGNOSIS — Z12.31 BREAST CANCER SCREENING BY MAMMOGRAM: ICD-10-CM

## 2024-02-20 DIAGNOSIS — Z11.59 NEED FOR HEPATITIS C SCREENING TEST: ICD-10-CM

## 2024-02-20 DIAGNOSIS — Z13.820 OSTEOPOROSIS SCREENING: ICD-10-CM

## 2024-02-20 DIAGNOSIS — Z12.2 ENCOUNTER FOR SCREENING FOR LUNG CANCER: ICD-10-CM

## 2024-02-20 DIAGNOSIS — Z12.11 COLON CANCER SCREENING: ICD-10-CM

## 2024-02-20 DIAGNOSIS — Z87.891 PERSONAL HISTORY OF NICOTINE DEPENDENCE: ICD-10-CM

## 2024-02-20 DIAGNOSIS — I10 HYPERTENSION, UNSPECIFIED TYPE: Primary | ICD-10-CM

## 2024-02-20 LAB — HCV AB SERPL QL IA: NONREACTIVE

## 2024-02-20 PROCEDURE — 36415 COLL VENOUS BLD VENIPUNCTURE: CPT

## 2024-02-20 PROCEDURE — 99215 OFFICE O/P EST HI 40 MIN: CPT | Mod: PBBFAC

## 2024-02-20 PROCEDURE — 86803 HEPATITIS C AB TEST: CPT

## 2024-02-20 NOTE — PROGRESS NOTES
[facility] FM Clinic Progress Note    ID:  Tiffanie Rowan   MRN:  [id number]     2024    Chief Complaint:    Chief Complaint   Patient presents with    Hypertension     Headaches improving     History of Present Illness:  Tiffanie Rowan is a 68 y.o. female who presents to [facility] FM clinic for:     Conditions addressed this visit:  - HTN: currently seeing [facility] cardiology. Hx of HTN emergency with TIA and NSTEMI in 2024. Has since stopped tobacco and alcohol use. Intolerant of nifedipine, amlodipine, lisinopril d/t headache. Reports improvement in HA. Previously tolerated ARB/HCTZ, however unable to continue d/t coverage. Currently on losartan 50mg and chlorthiadone 25mg daily. State she is joining [employer] soon.    Healthcare Maintenance:   - Hep C Screening: NR 2023  - Cervical Cancer Screening: test out, denies hx of abnormal pap  - Tetanus Vaccine: discuss at next visit  - Shingles Vaccine: discuss at next visit  - Pneumococcal vaccine:  - Mammogram: ordered today  - Colorectal Cancer Screening: mother  from colon cancer   - DEXA: ordered today   - LDCT: ordered today   - A1c: 5.4% in 2024  - LDL: 175 in 2024, on crestor 40    Care Team:  - [facility] cardiology: mgt of HTN    Medical History  Review of patient's allergies indicates:   Allergen Reactions    Ciprofloxacin Itching    Iodine Itching, Swelling and Hives    Lisinopril Other (See Comments)     Cough     Cephalexin Hives     Past Medical History:   Diagnosis Date    Hypertension    Social Hx:  reports that she has quit smoking. Her smoking use included cigarettes. She started smoking about 55 years ago. She has a 27.6 pack-year smoking history. She has been exposed to tobacco smoke. She has never used smokeless tobacco. She reports that she does not currently use alcohol. She reports that she does not use drugs.  FH: family history includes Asthma in her father; Hypertension in her father and mother; Stroke in her mother.    Medication List with  "Changes/Refills   Current Medications    CHLORTHALIDONE (HYGROTEN) 25 MG TAB    Take 1 tablet (25 mg total) by mouth once daily.    LOSARTAN (COZAAR) 50 MG TABLET    Take 1 tablet (50 mg total) by mouth once daily.    ROSUVASTATIN (CRESTOR) 40 MG TAB    Take 1 tablet (40 mg total) by mouth every evening.       Review of Systems:  ROS reviewed with patient and updated below.  Review of Systems   Constitutional:  Negative for fever.   HENT:  Negative for congestion.    Respiratory:  Negative for shortness of breath.    Cardiovascular:  Negative for chest pain.   Gastrointestinal:  Negative for abdominal pain.   Genitourinary:  Negative for dysuria.   Skin:  Negative for color change.   Neurological:  Negative for dizziness.   Pertinent positives and negatives as mentioned in HPI    Objective:  Vitals:    02/20/24 1349   BP: 122/78   BP Location: Right arm   Patient Position: Sitting   BP Method: Large (Automatic)   Pulse: 71   Resp: 18   Temp: 97.4 °F (36.3 °C)   TempSrc: Oral   SpO2: 99%   Weight: 81.6 kg (180 lb)   Height: 5' 2.01" (1.575 m)        Physical Exam  Vitals reviewed.   Eyes:      Extraocular Movements: Extraocular movements intact.   Cardiovascular:      Rate and Rhythm: Normal rate and regular rhythm.      Heart sounds: No murmur heard.  Pulmonary:      Effort: Pulmonary effort is normal.      Breath sounds: No stridor. No wheezing.   Skin:     General: Skin is warm and dry.      Coloration: Skin is not pale.   Neurological:      Mental Status: She is alert and oriented to person, place, and time.   Psychiatric:         Behavior: Behavior normal.            Assessment/Plan:  Tiffanie was seen today for hypertension.    Diagnoses and all orders for this visit:    Hypertension, unspecified type  -     BP at goal today. log reviewed, most readings 150/80s  -     Continue current regimen of chlorthiadone and losartan and CTM for signs of gout  -     maintain apt with cardiology   -     Patient voiced " understand of importance of healthy lifestyle including exercise 150min/wk, healthy diet and avoidance of greasy, sugary, fried, or fast foods. Educational handout provided.   -     DASH diet discussed and educational handout provided to patient     Hyperlipidemia, unspecified hyperlipidemia type  -      in 01/2024  -     tolerating on crestor 40mg well, continue   -     Repeat lipid panel in 6mo    Breast cancer screening by mammogram  -     Mammo Digital Screening Bilat w/ Julio; Future    Need for hepatitis C screening test  -     Hepatitis C antibody: NR    Osteoporosis screening  Asymptomatic menopausal state  -     DXA Bone Density Axial Skeleton 1 or more sites; Future    Colon cancer screening  -     not candidate for cologuard as mother passed away from colon cancer   -     Ambulatory referral/consult to Gastroenterology; placed to Dr Teodoro Kaplan     Encounter for screening for lung cancer  Personal history of nicotine dependence  -     CT Chest Lung Screening Low Dose; Future      Follow up in about 3 months (around 5/20/2024) for routine follow up.    Future Appointments   Date Time Provider Department Center   2/28/2024  1:15 PM [facility] XR10 [id number] 350 LB LIMIT [facility] XRAY [place]   2/28/2024  1:45 PM [facility] MAMMO2 [facility] MAMMO [place]    3/11/2024  1:00 PM NURSE, [facility] CARDIOLOGY [facility] CARD [place]   5/21/2024 10:20 AM Ethel Turner MD [facility] FM RES [place]   6/10/2024  8:30 AM Idania Edwards MD [facility] CARD [place]     Ethel Turner MD  [facility]

## 2024-02-23 ENCOUNTER — PATIENT MESSAGE (OUTPATIENT)
Dept: ADMINISTRATIVE | Facility: HOSPITAL | Age: 69
End: 2024-02-23
Payer: MEDICARE

## 2024-02-27 ENCOUNTER — PATIENT OUTREACH (OUTPATIENT)
Dept: ADMINISTRATIVE | Facility: HOSPITAL | Age: 69
End: 2024-02-27
Payer: MEDICARE

## 2024-02-27 NOTE — PROGRESS NOTES
[employer] response received from pt. Pt due/requesting order/referral for CRC screening. Referral for colonoscopy placed per pcp on 2024.   Manually faxed to [facility], Dr Teodoro Kaplan. Pt notified via portal message response.     Mammogram and DEXA scheduled 2024. Encouraged pt to keep these very important appointments.    Population Health Chart Review & Patient Outreach Details      Further Action Needed If Patient Returns Outreach:            Updates Requested / Reviewed:     []  Care Everywhere    []     []  External Sources ([facility], [facility], DIS, etc.)    [] [facility]   [] [facility]   [] Other:    [x]  Care Team Updated   []  Removed  or Duplicate Orders   []  Immunization Reconciliation Completed / Queried    [] [place]   [] [place]   [] [place]   [] [place]      Health Maintenance Topics Addressed and Outreach Outcomes / Actions Taken:             Breast Cancer Screening []  Mammogram Order Placed    [x]  Mammogram Screening Scheduled 2024    []  External Records Requested & Care Team Updated if Applicable    []  External Records Uploaded & Care Team Updated if Applicable    []  Pt Declined Scheduling Mammogram    []  Pt Will Schedule with External Provider / Order Routed & Care Team Updated if Applicable              Cervical Cancer Screening []  Pap Smear Scheduled in Primary Care or OBGYN    []  External Records Requested & Care Team Updated if Applicable       []  External Records Uploaded, Care Team Updated, & History Updated if Applicable    []  Patient Declined Scheduling Pap Smear    []  Patient Will Schedule with External Provider & Care Team Updated if Applicable                  Colorectal Cancer Screening [x]  Colonoscopy Case Request / Referral / Home Test Order Placed    []  External Records Requested & Care Team Updated if Applicable    []  External Records Uploaded, Care Team Updated, & History Updated if Applicable    []  Patient Declined  Completing Colon Cancer Screening    []  Patient Will Schedule with External Provider & Care Team Updated if Applicable    []  Fit Kit Mailed (add the SmartPhrase under additional notes)    []  Reminded Patient to Complete Home Test                Diabetic Eye Exam []  Eye Exam Screening Order Placed    []  Eye Camera Scheduled or Optometry/Ophthalmology Referral Placed    []  External Records Requested & Care Team Updated if Applicable    []  External Records Uploaded, Care Team Updated, & History Updated if Applicable    []  Patient Declined Scheduling Eye Exam    []  Patient Will Schedule with External Provider & Care Team Updated if Applicable             Blood Pressure Control []  Primary Care Follow Up Visit Scheduled     []  Remote Blood Pressure Reading Captured    []  Patient Declined Remote Reading or Scheduling Appt - Escalated to PCP    []  Patient Will Call Back or Send Portal Message with Reading                 HbA1c & Other Labs []  Overdue Lab(s) Ordered    []  Overdue Lab(s) Scheduled    []  External Records Uploaded & Care Team Updated if Applicable    []  Primary Care Follow Up Visit Scheduled     []  Reminded Patient to Complete A1c Home Test    []  Patient Declined Scheduling Labs or Will Call Back to Schedule    []  Patient Will Schedule with External Provider / Order Routed, & Care Team Updated if Applicable           Primary Care Appointment []  Primary Care Appt Scheduled    []  Patient Declined Scheduling or Will Call Back to Schedule    []  Pt Established with External Provider, Updated Care Team, & Informed Pt to Notify Payor if Applicable           Medication Adherence /    Statin Use []  Primary Care Appointment Scheduled    []  Patient Reminded to  Prescription    []  Patient Declined, Provider Notified if Needed    []  Sent Provider Message to Review to Evaluate Pt for Statin, Add Exclusion Dx Codes, Document   Exclusion in Problem List, Change Statin Intensity Level to  Moderate or High Intensity if Applicable                Osteoporosis Screening []  Dexa Order Placed    [x]  Dexa Appointment Scheduled 2/28/2024    []  External Records Requested & Care Team Updated    []  External Records Uploaded, Care Team Updated, & History Updated if Applicable    []  Patient Declined Scheduling Dexa or Will Call Back to Schedule    []  Patient Will Schedule with External Provider / Order Routed & Care Team Updated if Applicable       Additional Notes:

## 2024-02-28 ENCOUNTER — HOSPITAL ENCOUNTER (OUTPATIENT)
Dept: RADIOLOGY | Facility: HOSPITAL | Age: 69
Discharge: HOME | End: 2024-02-28
Payer: MEDICARE

## 2024-02-28 DIAGNOSIS — I10 HYPERTENSION, UNSPECIFIED TYPE: ICD-10-CM

## 2024-02-28 DIAGNOSIS — Z12.31 BREAST CANCER SCREENING BY MAMMOGRAM: ICD-10-CM

## 2024-02-28 DIAGNOSIS — E78.5 HYPERLIPIDEMIA, UNSPECIFIED HYPERLIPIDEMIA TYPE: ICD-10-CM

## 2024-02-28 DIAGNOSIS — Z78.0 ASYMPTOMATIC MENOPAUSAL STATE: ICD-10-CM

## 2024-02-28 DIAGNOSIS — Z13.820 OSTEOPOROSIS SCREENING: ICD-10-CM

## 2024-02-28 DIAGNOSIS — Z87.891 PERSONAL HISTORY OF NICOTINE DEPENDENCE: ICD-10-CM

## 2024-02-28 PROCEDURE — 77067 SCR MAMMO BI INCL CAD: CPT | Mod: 26,,, | Performed by: RADIOLOGY

## 2024-02-28 PROCEDURE — 71271 CT THORAX LUNG CANCER SCR C-: CPT | Mod: TC

## 2024-02-28 PROCEDURE — 77067 SCR MAMMO BI INCL CAD: CPT | Mod: TC

## 2024-02-28 PROCEDURE — 77063 BREAST TOMOSYNTHESIS BI: CPT | Mod: 26,,, | Performed by: RADIOLOGY

## 2024-02-28 PROCEDURE — 77080 DXA BONE DENSITY AXIAL: CPT | Mod: TC

## 2024-03-01 ENCOUNTER — DOCUMENTATION ONLY (OUTPATIENT)
Dept: CARDIOLOGY | Facility: HOSPITAL | Age: 69
End: 2024-03-01
Payer: MEDICARE

## 2024-03-01 NOTE — PROGRESS NOTES
Preoperative cardiovascular risk assessment    Pt needs to undergo a colonoscopy. Recent nuclear stress test was normal.  She is at low cardiovascular risk for a low risk procedure.     Idania Edwards MD  Cardiology staff

## 2024-03-11 ENCOUNTER — CLINICAL SUPPORT (OUTPATIENT)
Dept: CARDIOLOGY | Facility: CLINIC | Age: 69
End: 2024-03-11
Payer: MEDICARE

## 2024-03-11 VITALS
WEIGHT: 177 LBS | RESPIRATION RATE: 20 BRPM | HEART RATE: 53 BPM | SYSTOLIC BLOOD PRESSURE: 119 MMHG | DIASTOLIC BLOOD PRESSURE: 76 MMHG | OXYGEN SATURATION: 100 % | HEIGHT: 62 IN | TEMPERATURE: 98 F | BODY MASS INDEX: 32.57 KG/M2

## 2024-03-11 DIAGNOSIS — I10 UNCONTROLLED HYPERTENSION: Primary | ICD-10-CM

## 2024-03-11 PROCEDURE — 99213 OFFICE O/P EST LOW 20 MIN: CPT | Mod: PBBFAC

## 2024-03-11 NOTE — PROGRESS NOTES
HERE FOR B/P CHECK 119/76 MANUAL LEFT HR-53  TOOK MEDS AT 9 AM   DENIES ANY PROBLEMS WITH NEW MED  INSTRUCTED TO START TAKING B/P EVERY OTHER DAY   /90 START DAILY IF HIGHER GIVE US A CALL  CONTINUE TAKING MEDS AS INSTRUCTED   CONTINUE LOW TO NO SALT DIET   ED PRECAUTIONS GIVEN  INSTRUCTED TO DOCUMENT MUSCLE PAIN TIL F/U

## 2024-03-15 NOTE — PROGRESS NOTES
Faculty Attestation: Tiffanie Rowan  was seen in Family Medicine Clinic. Discussed with resident at the time of the visit. History of Present Illness, Physical Exam, and Assessment and Plan reviewed. Treatment plan is reasonable and appropriate. Compliance with treatment recommendations is important.         Gris Baltazar MD  Sports Medicine

## 2024-04-15 PROBLEM — G45.9 TIA (TRANSIENT ISCHEMIC ATTACK): Status: RESOLVED | Noted: 2024-01-11 | Resolved: 2024-04-15

## 2024-05-21 ENCOUNTER — OFFICE VISIT (OUTPATIENT)
Dept: FAMILY MEDICINE | Facility: CLINIC | Age: 69
End: 2024-05-21
Payer: MEDICARE

## 2024-05-21 VITALS
RESPIRATION RATE: 18 BRPM | OXYGEN SATURATION: 100 % | HEART RATE: 53 BPM | WEIGHT: 177 LBS | SYSTOLIC BLOOD PRESSURE: 135 MMHG | DIASTOLIC BLOOD PRESSURE: 81 MMHG | TEMPERATURE: 98 F | HEIGHT: 62 IN | BODY MASS INDEX: 32.57 KG/M2

## 2024-05-21 DIAGNOSIS — M85.80 OSTEOPENIA, UNSPECIFIED LOCATION: ICD-10-CM

## 2024-05-21 DIAGNOSIS — I10 HYPERTENSION, UNSPECIFIED TYPE: Primary | ICD-10-CM

## 2024-05-21 DIAGNOSIS — G44.201 ACUTE INTRACTABLE TENSION-TYPE HEADACHE: ICD-10-CM

## 2024-05-21 PROCEDURE — 99214 OFFICE O/P EST MOD 30 MIN: CPT | Mod: PBBFAC
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
## 2024-05-22 NOTE — PROGRESS NOTES
I have discussed the case with the resident and reviewed the resident's history and physical, assessment, plan, and progress note. I agree with the findings.       Jero Valdez MD  [facility]

## 2024-08-23 ENCOUNTER — OFFICE VISIT (OUTPATIENT)
Dept: FAMILY MEDICINE | Facility: CLINIC | Age: 69
End: 2024-08-23
Payer: MEDICARE

## 2024-08-23 VITALS
OXYGEN SATURATION: 98 % | SYSTOLIC BLOOD PRESSURE: 138 MMHG | TEMPERATURE: 98 F | RESPIRATION RATE: 20 BRPM | HEART RATE: 66 BPM | BODY MASS INDEX: 34.19 KG/M2 | HEIGHT: 62 IN | WEIGHT: 185.81 LBS | DIASTOLIC BLOOD PRESSURE: 78 MMHG

## 2024-08-23 DIAGNOSIS — R53.83 FATIGUE, UNSPECIFIED TYPE: ICD-10-CM

## 2024-08-23 DIAGNOSIS — R51.9 NONINTRACTABLE EPISODIC HEADACHE, UNSPECIFIED HEADACHE TYPE: ICD-10-CM

## 2024-08-23 DIAGNOSIS — I10 HYPERTENSION, UNSPECIFIED TYPE: Primary | ICD-10-CM

## 2024-08-23 DIAGNOSIS — Z12.11 COLON CANCER SCREENING: ICD-10-CM

## 2024-08-23 DIAGNOSIS — R63.5 WEIGHT GAIN: ICD-10-CM

## 2024-08-23 LAB
ALBUMIN SERPL-MCNC: 3.8 G/DL (ref 3.4–4.8)
ALBUMIN/GLOB SERPL: 1.2 RATIO (ref 1.1–2)
ALP SERPL-CCNC: 80 UNIT/L (ref 40–150)
ALT SERPL-CCNC: 19 UNIT/L (ref 0–55)
ANION GAP SERPL CALC-SCNC: 8 MEQ/L
AST SERPL-CCNC: 22 UNIT/L (ref 5–34)
BASOPHILS # BLD AUTO: 0.04 X10(3)/MCL
BASOPHILS NFR BLD AUTO: 0.9 %
BILIRUB SERPL-MCNC: 1.2 MG/DL
BUN SERPL-MCNC: 24.6 MG/DL (ref 9.8–20.1)
CALCIUM SERPL-MCNC: 10.1 MG/DL (ref 8.4–10.2)
CHLORIDE SERPL-SCNC: 106 MMOL/L (ref 98–107)
CO2 SERPL-SCNC: 27 MMOL/L (ref 23–31)
CREAT SERPL-MCNC: 1.15 MG/DL (ref 0.55–1.02)
CREAT/UREA NIT SERPL: 21
EOSINOPHIL # BLD AUTO: 0.06 X10(3)/MCL (ref 0–0.9)
EOSINOPHIL NFR BLD AUTO: 1.4 %
ERYTHROCYTE [DISTWIDTH] IN BLOOD BY AUTOMATED COUNT: 14 % (ref 11.5–17)
ERYTHROCYTE [SEDIMENTATION RATE] IN BLOOD: 5 MM/HR (ref 0–20)
GFR SERPLBLD CREATININE-BSD FMLA CKD-EPI: 52 ML/MIN/1.73/M2
GLOBULIN SER-MCNC: 3.3 GM/DL (ref 2.4–3.5)
GLUCOSE SERPL-MCNC: 105 MG/DL (ref 82–115)
HCT VFR BLD AUTO: 41.1 % (ref 37–47)
HGB BLD-MCNC: 13.7 G/DL (ref 12–16)
IMM GRANULOCYTES # BLD AUTO: 0.01 X10(3)/MCL (ref 0–0.04)
IMM GRANULOCYTES NFR BLD AUTO: 0.2 %
LYMPHOCYTES # BLD AUTO: 1.75 X10(3)/MCL (ref 0.6–4.6)
LYMPHOCYTES NFR BLD AUTO: 40.3 %
MCH RBC QN AUTO: 31.5 PG (ref 27–31)
MCHC RBC AUTO-ENTMCNC: 33.3 G/DL (ref 33–36)
MCV RBC AUTO: 94.5 FL (ref 80–94)
MONOCYTES # BLD AUTO: 0.43 X10(3)/MCL (ref 0.1–1.3)
MONOCYTES NFR BLD AUTO: 9.9 %
NEUTROPHILS # BLD AUTO: 2.05 X10(3)/MCL (ref 2.1–9.2)
NEUTROPHILS NFR BLD AUTO: 47.3 %
NRBC BLD AUTO-RTO: 0 %
PLATELET # BLD AUTO: 233 X10(3)/MCL (ref 130–400)
PMV BLD AUTO: 12.1 FL (ref 7.4–10.4)
POTASSIUM SERPL-SCNC: 3.4 MMOL/L (ref 3.5–5.1)
PROT SERPL-MCNC: 7.1 GM/DL (ref 5.8–7.6)
RBC # BLD AUTO: 4.35 X10(6)/MCL (ref 4.2–5.4)
SODIUM SERPL-SCNC: 141 MMOL/L (ref 136–145)
T4 FREE SERPL-MCNC: 0.86 NG/DL (ref 0.7–1.48)
TSH SERPL-ACNC: 1.2 UIU/ML (ref 0.35–4.94)
WBC # BLD AUTO: 4.34 X10(3)/MCL (ref 4.5–11.5)

## 2024-08-23 PROCEDURE — 99215 OFFICE O/P EST HI 40 MIN: CPT | Mod: PBBFAC

## 2024-08-23 PROCEDURE — 84443 ASSAY THYROID STIM HORMONE: CPT

## 2024-08-23 PROCEDURE — 84439 ASSAY OF FREE THYROXINE: CPT

## 2024-08-23 PROCEDURE — 85025 COMPLETE CBC W/AUTO DIFF WBC: CPT

## 2024-08-23 PROCEDURE — 36415 COLL VENOUS BLD VENIPUNCTURE: CPT

## 2024-08-23 PROCEDURE — 80053 COMPREHEN METABOLIC PANEL: CPT

## 2024-08-23 PROCEDURE — 85652 RBC SED RATE AUTOMATED: CPT

## 2024-08-23 NOTE — PROGRESS NOTES
"[facility] Clinic Progress Note    ID:  Tiffanie Rowan   MRN:  [id number]     8/23/2024    Chief Complaint:    Chief Complaint   Patient presents with    Hypertension     3 month visit for htn,states check her b/p at home,lt. Temporal pain radiates to her eyes     History of Present Illness:  Tiffanie Rowan is a 69 y.o. female who presents to [facility] clinic for:     Conditions addressed this visit:  - HTN: currently seeing [facility] cardiology. Hx of HTN emergency with TIA and NSTEMI in 01/2024. Has since stopped tobacco and alcohol use. Intolerant of nifedipine, amlodipine, lisinopril d/t headache. Regimen includes losartan 50mg and chlorthalidone 25mg.  - weight gain: reports weight gain, lower back pain worse at the end of the day, fatigue. First noticed after starting new job in April 2024. Works as [employer]  and stands for several hours. Would like to join gym, however has not done so. Denies difficulty ambulating, syncope, palpitations, SOB, chest pain.   - HA: left sided, sharp, intermittent, lasting several seconds, occurring 2-3 times weekly over the last few months. Reports can "feel behind my eye". States she is spending many hours on computer recently. Wears glasses, has not had eye eval in several years. Denies dizziness, vision changes, pain with chewing. Does not take tylenol.     Healthcare Maintenance:   - Hep C Screening: NR 02/2023  - Cervical Cancer Screening: test out, denies hx of abnormal pap  - Tetanus Vaccine: not covered in clinic, recommend to obtain from pharmacy   - Shingles Vaccine: discuss at next visit   - Pneumococcal vaccine: Discuss at next visit    - Mammogram: birads 1 b/l in 02/2024  - Colorectal Cancer Screening: mother with colon cancer, colonoscopy screening only   - DEXA: osteopenia, 02/28/2024   - LDCT: lung rads 1 in 02/28/2024  - A1c: 5.4% in 01/2024  - LDL: 175 in 01/2024, on crestor 40     Care Team:  - [facility] cardiology: mgt of HTN    Medical History  Review of patient's " allergies indicates:   Allergen Reactions    Ciprofloxacin Itching    Iodine Itching, Swelling and Hives    Lisinopril Other (See Comments)     Cough     Cephalexin Hives     Past Medical History:   Diagnosis Date    Hypertension      Social History     Tobacco Use    Smoking status: Former     Current packs/day: 0.50     Average packs/day: 0.5 packs/day for 55.6 years (27.8 ttl pk-yrs)     Types: Cigarettes     Start date: 1/10/1969     Passive exposure: Past    Smokeless tobacco: Never   Substance Use Topics    Alcohol use: Not Currently    Drug use: Never     Past Surgical History:   Procedure Laterality Date    atopic      LYMPH NODE BIOPSY/EXCISION      TONSILLECTOMY      TUBAL LIGATION       family history includes Asthma in her father; Hypertension in her father and mother; Stroke in her mother.       Medication List with Changes/Refills   Current Medications    CHLORTHALIDONE (HYGROTEN) 25 MG TAB    Take 1 tablet (25 mg total) by mouth once daily.    LOSARTAN (COZAAR) 50 MG TABLET    Take 1 tablet (50 mg total) by mouth once daily.    ROSUVASTATIN (CRESTOR) 40 MG TAB    Take 1 tablet (40 mg total) by mouth every evening.       Review of Systems:  ROS reviewed with patient and updated below.  Review of Systems   Constitutional:  Negative for activity change and unexpected weight change.   HENT:  Negative for hearing loss, rhinorrhea and trouble swallowing.    Eyes:  Negative for discharge and visual disturbance.   Respiratory:  Negative for chest tightness and wheezing.    Cardiovascular:  Negative for chest pain and palpitations.   Gastrointestinal:  Negative for blood in stool, constipation, diarrhea and vomiting.   Endocrine: Negative for polydipsia and polyuria.   Genitourinary:  Negative for difficulty urinating, dysuria, hematuria and menstrual problem.   Musculoskeletal:  Negative for arthralgias, joint swelling and neck pain.   Neurological:  Negative for weakness and headaches.    Psychiatric/Behavioral:  Negative for confusion and dysphoric mood.    Pertinent positives and negatives as mentioned in HPI    Objective:    Vitals:    08/23/24 1115   BP: 138/78   Pulse: 66   Resp: 20   Temp: 97.5 °F (36.4 °C)       Physical Exam  Vitals reviewed.   Eyes:      Extraocular Movements: Extraocular movements intact.   Cardiovascular:      Rate and Rhythm: Normal rate and regular rhythm.      Heart sounds: No murmur heard.  Pulmonary:      Effort: Pulmonary effort is normal.      Breath sounds: No stridor. No wheezing.   Skin:     General: Skin is warm and dry.      Coloration: Skin is not pale.   Neurological:      Mental Status: She is alert and oriented to person, place, and time.   Psychiatric:         Behavior: Behavior normal.          Assessment/Plan:  Tiffanie was seen today for hypertension.    Diagnoses and all orders for this visit:    Hypertension, unspecified type  -     at goal, continue current regimen     Weight gain  Fatigue, unspecified type  Lumbar back pain  -     suspect related to increase work demand  -     will eval for thyroid etiology; TSH/T4 wnl, low suspicion   -     CMP with evidence of dehydration, otherwise unremarkable. advise to increased water intake   -     eval for anemia: CBC, h/h wnl however elevated MCV, will eval with B12 and folate  -     provided patient with stretching exercises and advise to improve core body strength. Declined PT at this time     Nonintractable episodic headache, unspecified headache type  -     ddx: eye etiology, GCA, TMJ  -     advise to follow up with optho, reports she has reached out and will schedule apt   -      obtain ESR to eval for GCA, low concern as ESR wnl  -      keep headache diary and return to next visit to better characterize pain   -      CT in 01/2024 without acute abnormalities suggesting low suspicion for intracranial abnormalities     Colon cancer screening  -     Ambulatory referral/consult to [facility] for  screening colonoscopy       Follow up in about 3 months (around 11/23/2024) for routine follow up.    Ethel Turner MD  [employer] , -III

## 2024-11-25 ENCOUNTER — OFFICE VISIT (OUTPATIENT)
Dept: FAMILY MEDICINE | Facility: CLINIC | Age: 69
End: 2024-11-25
Payer: MEDICARE

## 2024-11-25 VITALS
HEART RATE: 76 BPM | BODY MASS INDEX: 33.46 KG/M2 | HEIGHT: 62 IN | WEIGHT: 181.81 LBS | SYSTOLIC BLOOD PRESSURE: 132 MMHG | DIASTOLIC BLOOD PRESSURE: 75 MMHG | RESPIRATION RATE: 20 BRPM | TEMPERATURE: 98 F | OXYGEN SATURATION: 100 %

## 2024-11-25 DIAGNOSIS — M62.838 MUSCLE SPASM: Primary | ICD-10-CM

## 2024-11-25 PROCEDURE — 96372 THER/PROPH/DIAG INJ SC/IM: CPT | Mod: PBBFAC

## 2024-11-25 PROCEDURE — 99214 OFFICE O/P EST MOD 30 MIN: CPT | Mod: PBBFAC

## 2024-11-25 RX ORDER — CYCLOBENZAPRINE HCL 5 MG
5 TABLET ORAL 2 TIMES DAILY PRN
Qty: 20 TABLET | Refills: 0 | Status: SHIPPED | OUTPATIENT
Start: 2024-11-25 | End: 2024-12-05

## 2024-11-25 RX ORDER — KETOROLAC TROMETHAMINE 30 MG/ML
30 INJECTION, SOLUTION INTRAMUSCULAR; INTRAVENOUS
Status: COMPLETED | OUTPATIENT
Start: 2024-11-25 | End: 2024-11-25

## 2024-11-25 RX ADMIN — KETOROLAC TROMETHAMINE 30 MG: 30 INJECTION, SOLUTION INTRAMUSCULAR; INTRAVENOUS at 11:11

## 2024-11-25 NOTE — PROGRESS NOTES
[facility] FM Clinic Progress Note    ID:  Tiffanie Rowan   MRN:  [id number]     11/25/2024    Chief Complaint:    Chief Complaint   Patient presents with    Pain     Posterior left lower flank, onset 2 weeks, rates pain 8/10     History of Present Illness:  Tiffanie Rowan is a 69 y.o. female who presents to [facility] FM clinic for:     Conditions addressed this visit:  Left flank pain:  - onset 2-3 weeks ago  - does not remember inciting factors or events   - denies falls trauma, lifting heavy, fever, urinary frequency, dysuria, abdominal pain, incontinence   - worse with positional changes and palpation   - improved with advil     Problem List:  HTN: currently seeing [facility] cardiology. Hx of HTN emergency with TIA and NSTEMI in 01/2024. Has since stopped tobacco and alcohol use. Regimen includes losartan 50mg and chlorthalidone 25mg.  Weight gain: Noticed after starting new job in April 2024. Works as [employer]  and stands for several hours. Would like to join gym, however has not done so.      Healthcare Maintenance:   - Hep C Screening: NR 02/2023  - Cervical Cancer Screening: test out, denies hx of abnormal pap  - Tetanus Vaccine: not covered in clinic, recommend to obtain from pharmacy   - Shingles Vaccine: not yet done  - Pneumococcal vaccine: not yet done  - Mammogram: birads 1 b/l in 02/2024  - Colorectal Cancer Screening: mother with colon cancer, colonoscopy screening only   - DEXA: osteopenia, 02/28/2024   - LDCT: lung rads 1 in 02/28/2024  - A1c: 5.4% in 01/2024  - LDL: 175 in 01/2024, on crestor 40     Care Team:  - [facility] cardiology: mgt of HTN      Medication List with Changes/Refills   New Medications    CYCLOBENZAPRINE (FLEXERIL) 5 MG TABLET    Take 1 tablet (5 mg total) by mouth 2 (two) times daily as needed for Muscle spasms.   Current Medications    CHLORTHALIDONE (HYGROTEN) 25 MG TAB    Take 1 tablet (25 mg total) by mouth once daily.    LOSARTAN (COZAAR) 50 MG TABLET    Take 1 tablet (50 mg total) by mouth  once daily.    ROSUVASTATIN (CRESTOR) 40 MG TAB    Take 1 tablet (40 mg total) by mouth every evening.       Review of Systems:  Pertinent positives and negatives as mentioned in HPI    Objective:    Vitals:    11/25/24 0958   BP: 132/75   Pulse: 76   Resp: 20   Temp: 98 °F (36.7 °C)       Physical Exam  Vitals reviewed.   Eyes:      Extraocular Movements: Extraocular movements intact.   Cardiovascular:      Rate and Rhythm: Normal rate.   Pulmonary:      Effort: Pulmonary effort is normal. No respiratory distress.   Abdominal:      Tenderness: There is no right CVA tenderness or left CVA tenderness.   Musculoskeletal:      Lumbar back: Spasms and tenderness present. No swelling, edema, deformity, signs of trauma, lacerations or bony tenderness. Normal range of motion.      Comments: Left paraspinal tenderness and spasm at level of L3   Skin:     General: Skin is warm and dry.      Coloration: Skin is not pale.   Neurological:      Mental Status: She is alert and oriented to person, place, and time.   Psychiatric:         Behavior: Behavior normal.         Assessment/Plan:  Tiffanie was seen today for pain.    Diagnoses and all orders for this visit:    Muscle spasm  -     CMP and renal function reviewed   -     received ketorolac injection 30 mg in clinic  -     start cyclobenzaprine (FLEXERIL) 5 MG tablet; Take 1 tablet (5 mg total) by mouth 2 (two) times daily as needed for Muscle spasms.  -     Stretching exercise discussed and educational handout provided to patient.   -     consider formal PT if HEP does not improve symptoms       Follow up in about 3 months (around 2/25/2025).    Ethel Turner MD  [facility] , [facility]

## 2024-11-26 NOTE — PROGRESS NOTES
Faculty addendum: Patient discussed and examined with resident on day of encounter.  Care provided reasonable and necessary. I participated in the management of the patient and was immediately available throughout the encounter. Services were furnished in a primary care center located in the outpatient department of a [facility]. I agree with the resident's findings and plan as documented in the resident's note.     Physical exam: well appearing, in no acute distress    Aidee Rey, DO

## 2024-12-02 DIAGNOSIS — I10 UNCONTROLLED HYPERTENSION: ICD-10-CM

## 2024-12-02 RX ORDER — CHLORTHALIDONE 25 MG/1
25 TABLET ORAL DAILY
Qty: 30 TABLET | Refills: 11 | Status: SHIPPED | OUTPATIENT
Start: 2024-12-02 | End: 2025-12-02

## 2025-02-03 DIAGNOSIS — I10 UNCONTROLLED HYPERTENSION: ICD-10-CM

## 2025-02-03 RX ORDER — LOSARTAN POTASSIUM 50 MG/1
50 TABLET ORAL DAILY
Qty: 90 TABLET | Refills: 3 | Status: CANCELLED | OUTPATIENT
Start: 2025-02-03 | End: 2026-02-03

## 2025-02-03 RX ORDER — LOSARTAN POTASSIUM 50 MG/1
50 TABLET ORAL DAILY
Qty: 90 TABLET | Refills: 3 | Status: SHIPPED | OUTPATIENT
Start: 2025-02-03 | End: 2026-02-03

## 2025-02-25 ENCOUNTER — LAB VISIT (OUTPATIENT)
Dept: LAB | Facility: HOSPITAL | Age: 70
End: 2025-02-25
Attending: PODIATRIST
Payer: MEDICARE

## 2025-02-25 DIAGNOSIS — I10 UNCONTROLLED HYPERTENSION: ICD-10-CM

## 2025-02-25 DIAGNOSIS — M10.9 GOUT, UNSPECIFIED CAUSE, UNSPECIFIED CHRONICITY, UNSPECIFIED SITE: ICD-10-CM

## 2025-02-25 DIAGNOSIS — M67.371 TRANSIENT SYNOVITIS, RIGHT ANKLE AND FOOT: ICD-10-CM

## 2025-02-25 DIAGNOSIS — E78.5 HYPERLIPIDEMIA, UNSPECIFIED HYPERLIPIDEMIA TYPE: ICD-10-CM

## 2025-02-25 DIAGNOSIS — M20.11 ACQUIRED HALLUX VALGUS OF RIGHT FOOT: Primary | ICD-10-CM

## 2025-02-25 LAB
ALBUMIN SERPL-MCNC: 3.8 G/DL (ref 3.4–4.8)
ALBUMIN/GLOB SERPL: 1.2 RATIO (ref 1.1–2)
ALP SERPL-CCNC: 81 UNIT/L (ref 40–150)
ALT SERPL-CCNC: 16 UNIT/L (ref 0–55)
ANION GAP SERPL CALC-SCNC: 8 MEQ/L
AST SERPL-CCNC: 22 UNIT/L (ref 5–34)
BILIRUB SERPL-MCNC: 1.5 MG/DL
BUN SERPL-MCNC: 20.4 MG/DL (ref 9.8–20.1)
CALCIUM SERPL-MCNC: 9.7 MG/DL (ref 8.4–10.2)
CHLORIDE SERPL-SCNC: 106 MMOL/L (ref 98–107)
CHOLEST SERPL-MCNC: 247 MG/DL
CHOLEST/HDLC SERPL: 3 {RATIO} (ref 0–5)
CO2 SERPL-SCNC: 28 MMOL/L (ref 23–31)
CREAT SERPL-MCNC: 1.17 MG/DL (ref 0.55–1.02)
CREAT/UREA NIT SERPL: 17
GFR SERPLBLD CREATININE-BSD FMLA CKD-EPI: 51 ML/MIN/1.73/M2
GLOBULIN SER-MCNC: 3.3 GM/DL (ref 2.4–3.5)
GLUCOSE SERPL-MCNC: 106 MG/DL (ref 82–115)
HDLC SERPL-MCNC: 71 MG/DL (ref 35–60)
LDLC SERPL CALC-MCNC: 152 MG/DL (ref 50–140)
POTASSIUM SERPL-SCNC: 3.6 MMOL/L (ref 3.5–5.1)
PROT SERPL-MCNC: 7.1 GM/DL (ref 5.8–7.6)
SODIUM SERPL-SCNC: 142 MMOL/L (ref 136–145)
TRIGL SERPL-MCNC: 118 MG/DL (ref 37–140)
URATE SERPL-MCNC: 11.2 MG/DL (ref 2.6–6)
VLDLC SERPL CALC-MCNC: 24 MG/DL

## 2025-02-25 PROCEDURE — 36415 COLL VENOUS BLD VENIPUNCTURE: CPT

## 2025-02-25 PROCEDURE — 80053 COMPREHEN METABOLIC PANEL: CPT

## 2025-02-25 PROCEDURE — 84550 ASSAY OF BLOOD/URIC ACID: CPT

## 2025-02-25 PROCEDURE — 80061 LIPID PANEL: CPT

## 2025-04-09 ENCOUNTER — ON-DEMAND VIRTUAL (OUTPATIENT)
Dept: URGENT CARE | Facility: CLINIC | Age: 70
End: 2025-04-09
Payer: MEDICARE

## 2025-04-09 DIAGNOSIS — M10.9 ACUTE GOUT OF RIGHT KNEE, UNSPECIFIED CAUSE: Primary | ICD-10-CM

## 2025-04-09 PROCEDURE — 98004 SYNCH AUDIO-VIDEO EST SF 10: CPT | Mod: 95,,,

## 2025-04-09 RX ORDER — INDOMETHACIN 25 MG/1
25 CAPSULE ORAL 3 TIMES DAILY PRN
Qty: 20 CAPSULE | Refills: 0 | Status: SHIPPED | OUTPATIENT
Start: 2025-04-09

## 2025-04-09 RX ORDER — COLCHICINE 0.6 MG/1
TABLET ORAL
Qty: 10 TABLET | Refills: 0 | Status: SHIPPED | OUTPATIENT
Start: 2025-04-09

## 2025-04-09 NOTE — PROGRESS NOTES
Subjective:      Patient ID: Tiffanie Rowan is a 69 y.o. female.    Vitals:  vitals were not taken for this visit.     Chief Complaint: Gout      Visit Type: TELE AUDIOVISUAL - This visit was conducted virtually based on  subjective information and limited objective exam    Present with the patient at the time of consultation: TELEMED PRESENT WITH PATIENT: None  LOCATION OF PATIENT [place], [place]    Two patient identifiers used to verify patient- saying out date of birth and full name.       Past Medical History:   Diagnosis Date    Hypertension      Past Surgical History:   Procedure Laterality Date    atopic      LYMPH NODE BIOPSY/EXCISION      TONSILLECTOMY      TUBAL LIGATION       Review of patient's allergies indicates:   Allergen Reactions    Ciprofloxacin Itching    Iodine Itching, Swelling and Hives    Lisinopril Other (See Comments)     Cough     Cephalexin Hives     Medications Ordered Prior to Encounter[1]  Family History   Problem Relation Name Age of Onset    Stroke Mother      Hypertension Mother      Asthma Father      Hypertension Father         Medications Ordered                [id number] - [place] - [address] AT [facility] [address]   [address] [place] [address]    Telephone: [phone]   Fax: [phone]   Hours: Not open 24 hours                         E-Prescribed (2 of 2)              colchicine (COLCRYS) 0.6 mg tablet    Sig: Please take two tablets (1.2 mg) at the first sign of flare, followed by 0.6 mg after 1 hour. On day 2 onwards, take 0.6 mg once or twice daily until flare resolves       Start: 4/9/25     Quantity: 10 tablet Refills: 0                         indomethacin (INDOCIN) 25 MG capsule    Sig: Take 1 capsule (25 mg total) by mouth 3 (three) times daily as needed (pain).       Start: 4/9/25     Quantity: 20 capsule Refills: 0                           Ohs Peq Odvv Intake    4/9/2025 11:21 AM CDT - Filed  by Patient   What is your current physical address in the event of a medical emergency? [address], [place] [address]   Are you able to take your vital signs? Yes   Systolic Blood Pressure: 137   Diastolic Blood Pressure: 52   Weight: 179   Height: 62   Pulse: 52   Temperature:    Respiration rate:    Pulse Oxygen:    Please attach any relevant images or files    Is your employer contracted with [facility]? No         70 yo female c/o recurrent gout on R knee.   Reports erythematous, warmth, tender R knee joint for the past few days.   Last gout attack was a year ago.   Denies fever, chills, N/V, weakness, fatigue.           Constitution: Negative for activity change, appetite change, chills and fever.   Musculoskeletal:  Positive for pain, joint pain, joint swelling, gout and muscle ache.   Skin:  Positive for erythema. Negative for abscess.        Objective:   The physical exam was conducted virtually.    Pt is alert and oriented.  Speech is clear and coherent.  Speaking in complete sentences.  No distress.  Mood and affect are normal.     Limited physical exam due to virtual visit.         Assessment:     1. Acute gout of right knee, unspecified cause        Plan:         Thank you for choosing [facility]!    Our goal in the [facility] is to always provide outstanding medical care. You may receive a survey by mail or e-mail in the next week regarding your experience today. We would greatly appreciate you completing and returning the survey. Your feedback provides us with a way to recognize our staff who provide very good care, and it helps us learn how to improve when your experience was below our aspiration of excellence.         We appreciate you trusting us with your medical care. We hope you feel better soon. We will be happy to take care of you for all of your future medical needs.    You must understand that you've received an Urgent Care  treatment only and that you may be released before all your medical problems are known or treated. You, the patient, will arrange for follow up care as instructed.    Follow up with your PCP or specialty clinic as directed in the next 1-2 weeks if not improved or as needed.  You can call [phone] to schedule an appointment with the appropriate provider.    If your condition worsens we recommend that you receive another evaluation in person, with your primary care provider, urgent care or at the emergency room immediately or contact your primary medical clinics after hours call service to discuss your concerns.         Acute gout of right knee, unspecified cause  -     colchicine (COLCRYS) 0.6 mg tablet; Please take two tablets (1.2 mg) at the first sign of flare, followed by 0.6 mg after 1 hour. On day 2 onwards, take 0.6 mg once or twice daily until flare resolves  Dispense: 10 tablet; Refill: 0  -     indomethacin (INDOCIN) 25 MG capsule; Take 1 capsule (25 mg total) by mouth 3 (three) times daily as needed (pain).  Dispense: 20 capsule; Refill: 0                         [1]   Current Outpatient Medications on File Prior to Visit   Medication Sig Dispense Refill    chlorthalidone (HYGROTEN) 25 MG Tab Take 1 tablet (25 mg total) by mouth once daily. 30 tablet 11    losartan (COZAAR) 50 MG tablet Take 1 tablet (50 mg total) by mouth once daily. 90 tablet 3    rosuvastatin (CRESTOR) 40 MG Tab Take 1 tablet (40 mg total) by mouth every evening. 90 tablet 3     No current facility-administered medications on file prior to visit.